# Patient Record
Sex: FEMALE | Race: WHITE | Employment: PART TIME
[De-identification: names, ages, dates, MRNs, and addresses within clinical notes are randomized per-mention and may not be internally consistent; named-entity substitution may affect disease eponyms.]

---

## 2017-01-18 ENCOUNTER — OFFICE VISIT (OUTPATIENT)
Dept: OBGYN | Facility: CLINIC | Age: 27
End: 2017-01-18

## 2017-01-18 ENCOUNTER — TELEPHONE (OUTPATIENT)
Dept: OBGYN | Facility: CLINIC | Age: 27
End: 2017-01-18

## 2017-01-18 VITALS
SYSTOLIC BLOOD PRESSURE: 110 MMHG | WEIGHT: 169 LBS | HEIGHT: 70 IN | DIASTOLIC BLOOD PRESSURE: 70 MMHG | BODY MASS INDEX: 24.2 KG/M2

## 2017-01-18 DIAGNOSIS — Z30.9 ENCOUNTER FOR PHYSICAL EXAMINATION, CONTRACEPTION, AND PAPANICOLAOU SMEAR: Primary | ICD-10-CM

## 2017-01-18 DIAGNOSIS — N94.6 DYSMENORRHEA: ICD-10-CM

## 2017-01-18 DIAGNOSIS — Z11.3 ROUTINE SCREENING FOR STI (SEXUALLY TRANSMITTED INFECTION): ICD-10-CM

## 2017-01-18 DIAGNOSIS — N89.8 VAGINAL IRRITATION: ICD-10-CM

## 2017-01-18 DIAGNOSIS — E28.2 PCOS (POLYCYSTIC OVARIAN SYNDROME): ICD-10-CM

## 2017-01-18 DIAGNOSIS — Z01.419 ENCOUNTER FOR PHYSICAL EXAMINATION, CONTRACEPTION, AND PAPANICOLAOU SMEAR: Primary | ICD-10-CM

## 2017-01-18 PROCEDURE — 99213 OFFICE O/P EST LOW 20 MIN: CPT | Performed by: OBSTETRICS & GYNECOLOGY

## 2017-01-18 RX ORDER — NORGESTIMATE AND ETHINYL ESTRADIOL 7DAYSX3 LO
1 KIT ORAL DAILY
Qty: 28 TABLET | Refills: 13 | Status: SHIPPED | OUTPATIENT
Start: 2017-01-18 | End: 2017-05-09

## 2017-01-18 ASSESSMENT — PATIENT HEALTH QUESTIONNAIRE - PHQ9
SUM OF ALL RESPONSES TO PHQ9 QUESTIONS 1 & 2: 2
2. FEELING DOWN, DEPRESSED OR HOPELESS: 1
SUM OF ALL RESPONSES TO PHQ QUESTIONS 1-9: 2
1. LITTLE INTEREST OR PLEASURE IN DOING THINGS: 1

## 2017-01-20 RX ORDER — METRONIDAZOLE 500 MG/1
500 TABLET ORAL 2 TIMES DAILY
Qty: 14 TABLET | Refills: 0 | Status: SHIPPED | OUTPATIENT
Start: 2017-01-20 | End: 2017-01-27

## 2017-01-23 RX ORDER — NORGESTIMATE AND ETHINYL ESTRADIOL 7DAYSX3 28
1 KIT ORAL DAILY
Qty: 28 TABLET | Refills: 12 | Status: SHIPPED | OUTPATIENT
Start: 2017-01-23 | End: 2018-03-14 | Stop reason: SDUPTHER

## 2017-01-24 ENCOUNTER — TELEPHONE (OUTPATIENT)
Dept: OBGYN | Facility: CLINIC | Age: 27
End: 2017-01-24

## 2017-05-09 ENCOUNTER — OFFICE VISIT (OUTPATIENT)
Dept: OBGYN CLINIC | Age: 27
End: 2017-05-09
Payer: MEDICARE

## 2017-05-09 VITALS
BODY MASS INDEX: 23.34 KG/M2 | WEIGHT: 163 LBS | SYSTOLIC BLOOD PRESSURE: 112 MMHG | HEIGHT: 70 IN | DIASTOLIC BLOOD PRESSURE: 62 MMHG

## 2017-05-09 DIAGNOSIS — N89.8 VAGINAL IRRITATION: Primary | ICD-10-CM

## 2017-05-09 PROCEDURE — 99213 OFFICE O/P EST LOW 20 MIN: CPT | Performed by: NURSE PRACTITIONER

## 2017-05-11 DIAGNOSIS — N89.8 VAGINAL IRRITATION: ICD-10-CM

## 2017-05-11 RX ORDER — NITROFURANTOIN 25; 75 MG/1; MG/1
100 CAPSULE ORAL 2 TIMES DAILY
Qty: 20 CAPSULE | Refills: 0 | Status: SHIPPED | OUTPATIENT
Start: 2017-05-11 | End: 2017-05-21

## 2018-02-21 PROBLEM — R41.840 LACK OF CONCENTRATION: Status: ACTIVE | Noted: 2018-02-21

## 2018-02-21 PROBLEM — F41.8 DEPRESSION WITH ANXIETY: Status: ACTIVE | Noted: 2018-02-21

## 2018-02-21 PROBLEM — N94.3 PMS (PREMENSTRUAL SYNDROME): Status: ACTIVE | Noted: 2018-02-21

## 2018-03-15 ENCOUNTER — HOSPITAL ENCOUNTER (OUTPATIENT)
Age: 28
Discharge: HOME | End: 2018-03-15
Payer: MEDICARE

## 2018-03-15 DIAGNOSIS — Z02.0 ENCOUNTER FOR SCHOOL HISTORY AND PHYSICAL EXAMINATION: ICD-10-CM

## 2018-03-15 DIAGNOSIS — F41.8 DEPRESSION WITH ANXIETY: ICD-10-CM

## 2018-03-15 LAB
HBV SURFACE AB TITR SER: <3.5 MIU/ML
HEPATITIS B SURFACE ANTIGEN: NONREACTIVE
RUBV IGG SER QL: 288.3 IU/ML
THYROXINE, FREE: 1.07 NG/DL (ref 0.93–1.7)
TSH SERPL DL<=0.05 MIU/L-ACNC: 2.91 MIU/L (ref 0.3–5)

## 2018-03-15 PROCEDURE — 36415 COLL VENOUS BLD VENIPUNCTURE: CPT

## 2018-03-15 PROCEDURE — 84443 ASSAY THYROID STIM HORMONE: CPT

## 2018-03-15 PROCEDURE — 86762 RUBELLA ANTIBODY: CPT

## 2018-03-15 PROCEDURE — 86787 VARICELLA-ZOSTER ANTIBODY: CPT

## 2018-03-15 PROCEDURE — 86765 RUBEOLA ANTIBODY: CPT

## 2018-03-15 PROCEDURE — 84439 ASSAY OF FREE THYROXINE: CPT

## 2018-03-15 PROCEDURE — 87340 HEPATITIS B SURFACE AG IA: CPT

## 2018-03-15 PROCEDURE — 86735 MUMPS ANTIBODY: CPT

## 2018-03-15 PROCEDURE — 86317 IMMUNOASSAY INFECTIOUS AGENT: CPT

## 2018-03-16 LAB
MEASLES IMMUNE (IGG): 4
MUV IGG SER QL: 2.38
VZV IGG SER QL IA: 1.91

## 2018-04-03 PROBLEM — Z79.899 MEDICATION MANAGEMENT: Status: ACTIVE | Noted: 2018-04-03

## 2018-04-03 PROBLEM — F98.8 ATTENTION DEFICIT DISORDER (ADD) WITHOUT HYPERACTIVITY: Status: ACTIVE | Noted: 2018-04-03

## 2018-06-26 RX ORDER — NORGESTIMATE AND ETHINYL ESTRADIOL 7DAYSX3 28
KIT ORAL
Qty: 28 TABLET | Refills: 1 | Status: SHIPPED | OUTPATIENT
Start: 2018-06-26 | End: 2018-10-15 | Stop reason: SDUPTHER

## 2018-10-15 PROBLEM — R41.840 LACK OF CONCENTRATION: Status: RESOLVED | Noted: 2018-02-21 | Resolved: 2018-10-15

## 2018-12-21 RX ORDER — NORGESTIMATE AND ETHINYL ESTRADIOL 7DAYSX3 28
KIT ORAL
Qty: 28 TABLET | Refills: 1 | OUTPATIENT
Start: 2018-12-21

## 2018-12-26 DIAGNOSIS — Z79.899 MEDICATION MANAGEMENT: ICD-10-CM

## 2018-12-26 DIAGNOSIS — F98.8 ATTENTION DEFICIT DISORDER (ADD) WITHOUT HYPERACTIVITY: ICD-10-CM

## 2018-12-26 RX ORDER — DEXTROAMPHETAMINE SACCHARATE, AMPHETAMINE ASPARTATE, DEXTROAMPHETAMINE SULFATE AND AMPHETAMINE SULFATE 2.5; 2.5; 2.5; 2.5 MG/1; MG/1; MG/1; MG/1
10 TABLET ORAL DAILY
Qty: 30 TABLET | Refills: 0 | Status: SHIPPED | OUTPATIENT
Start: 2018-12-26 | End: 2019-01-21 | Stop reason: SDUPTHER

## 2018-12-26 RX ORDER — DEXTROAMPHETAMINE SACCHARATE, AMPHETAMINE ASPARTATE MONOHYDRATE, DEXTROAMPHETAMINE SULFATE AND AMPHETAMINE SULFATE 7.5; 7.5; 7.5; 7.5 MG/1; MG/1; MG/1; MG/1
30 CAPSULE, EXTENDED RELEASE ORAL DAILY
Qty: 30 CAPSULE | Refills: 0 | Status: SHIPPED | OUTPATIENT
Start: 2018-12-26 | End: 2019-01-21 | Stop reason: SDUPTHER

## 2019-01-10 ENCOUNTER — TELEPHONE (OUTPATIENT)
Dept: PRIMARY CARE CLINIC | Age: 29
End: 2019-01-10

## 2019-01-10 RX ORDER — NORGESTIMATE AND ETHINYL ESTRADIOL 7DAYSX3 28
KIT ORAL
Qty: 28 TABLET | Refills: 1 | Status: SHIPPED | OUTPATIENT
Start: 2019-01-10 | End: 2019-03-11 | Stop reason: SDUPTHER

## 2019-01-21 ENCOUNTER — OFFICE VISIT (OUTPATIENT)
Dept: PRIMARY CARE CLINIC | Age: 29
End: 2019-01-21
Payer: MEDICARE

## 2019-01-21 VITALS
DIASTOLIC BLOOD PRESSURE: 70 MMHG | HEART RATE: 84 BPM | WEIGHT: 179.2 LBS | BODY MASS INDEX: 26.27 KG/M2 | SYSTOLIC BLOOD PRESSURE: 116 MMHG | OXYGEN SATURATION: 98 %

## 2019-01-21 DIAGNOSIS — F98.8 ATTENTION DEFICIT DISORDER (ADD) WITHOUT HYPERACTIVITY: ICD-10-CM

## 2019-01-21 DIAGNOSIS — N93.9 VAGINAL SPOTTING: ICD-10-CM

## 2019-01-21 DIAGNOSIS — Z79.899 MEDICATION MANAGEMENT: Primary | ICD-10-CM

## 2019-01-21 LAB
CONTROL: PRESENT
PREGNANCY TEST URINE, POC: NORMAL

## 2019-01-21 PROCEDURE — G8484 FLU IMMUNIZE NO ADMIN: HCPCS | Performed by: PHYSICIAN ASSISTANT

## 2019-01-21 PROCEDURE — 1036F TOBACCO NON-USER: CPT | Performed by: PHYSICIAN ASSISTANT

## 2019-01-21 PROCEDURE — G8419 CALC BMI OUT NRM PARAM NOF/U: HCPCS | Performed by: PHYSICIAN ASSISTANT

## 2019-01-21 PROCEDURE — 81025 URINE PREGNANCY TEST: CPT | Performed by: PHYSICIAN ASSISTANT

## 2019-01-21 PROCEDURE — 99213 OFFICE O/P EST LOW 20 MIN: CPT | Performed by: PHYSICIAN ASSISTANT

## 2019-01-21 PROCEDURE — G8427 DOCREV CUR MEDS BY ELIG CLIN: HCPCS | Performed by: PHYSICIAN ASSISTANT

## 2019-01-21 RX ORDER — DEXTROAMPHETAMINE SACCHARATE, AMPHETAMINE ASPARTATE, DEXTROAMPHETAMINE SULFATE AND AMPHETAMINE SULFATE 2.5; 2.5; 2.5; 2.5 MG/1; MG/1; MG/1; MG/1
10 TABLET ORAL DAILY
Qty: 30 TABLET | Refills: 0 | Status: SHIPPED | OUTPATIENT
Start: 2019-01-21 | End: 2019-03-11 | Stop reason: SDUPTHER

## 2019-01-21 RX ORDER — DEXTROAMPHETAMINE SACCHARATE, AMPHETAMINE ASPARTATE MONOHYDRATE, DEXTROAMPHETAMINE SULFATE AND AMPHETAMINE SULFATE 7.5; 7.5; 7.5; 7.5 MG/1; MG/1; MG/1; MG/1
30 CAPSULE, EXTENDED RELEASE ORAL DAILY
Qty: 30 CAPSULE | Refills: 0 | Status: SHIPPED | OUTPATIENT
Start: 2019-01-21 | End: 2019-03-11 | Stop reason: SDUPTHER

## 2019-01-21 ASSESSMENT — ENCOUNTER SYMPTOMS
SHORTNESS OF BREATH: 0
CHEST TIGHTNESS: 0

## 2019-01-25 DIAGNOSIS — Z79.899 MEDICATION MANAGEMENT: ICD-10-CM

## 2019-01-25 DIAGNOSIS — F98.8 ATTENTION DEFICIT DISORDER (ADD) WITHOUT HYPERACTIVITY: ICD-10-CM

## 2019-03-11 ENCOUNTER — HOSPITAL ENCOUNTER (OUTPATIENT)
Age: 29
Setting detail: SPECIMEN
Discharge: HOME | End: 2019-03-11
Payer: MEDICARE

## 2019-03-11 ENCOUNTER — OFFICE VISIT (OUTPATIENT)
Dept: OBGYN CLINIC | Age: 29
End: 2019-03-11
Payer: MEDICARE

## 2019-03-11 VITALS
SYSTOLIC BLOOD PRESSURE: 122 MMHG | BODY MASS INDEX: 26.4 KG/M2 | OXYGEN SATURATION: 98 % | DIASTOLIC BLOOD PRESSURE: 78 MMHG | HEART RATE: 83 BPM | HEIGHT: 70 IN | WEIGHT: 184.4 LBS

## 2019-03-11 DIAGNOSIS — F98.8 ATTENTION DEFICIT DISORDER (ADD) WITHOUT HYPERACTIVITY: ICD-10-CM

## 2019-03-11 DIAGNOSIS — Z79.899 MEDICATION MANAGEMENT: ICD-10-CM

## 2019-03-11 DIAGNOSIS — Z01.419 ENCOUNTER FOR WELL WOMAN EXAM WITH ROUTINE GYNECOLOGICAL EXAM: Primary | ICD-10-CM

## 2019-03-11 PROCEDURE — G8484 FLU IMMUNIZE NO ADMIN: HCPCS | Performed by: NURSE PRACTITIONER

## 2019-03-11 PROCEDURE — 99395 PREV VISIT EST AGE 18-39: CPT | Performed by: NURSE PRACTITIONER

## 2019-03-11 RX ORDER — DEXTROAMPHETAMINE SACCHARATE, AMPHETAMINE ASPARTATE, DEXTROAMPHETAMINE SULFATE AND AMPHETAMINE SULFATE 2.5; 2.5; 2.5; 2.5 MG/1; MG/1; MG/1; MG/1
10 TABLET ORAL DAILY
Qty: 30 TABLET | Refills: 0 | Status: SHIPPED | OUTPATIENT
Start: 2019-03-11 | End: 2019-04-29 | Stop reason: SDUPTHER

## 2019-03-11 RX ORDER — DEXTROAMPHETAMINE SACCHARATE, AMPHETAMINE ASPARTATE MONOHYDRATE, DEXTROAMPHETAMINE SULFATE AND AMPHETAMINE SULFATE 7.5; 7.5; 7.5; 7.5 MG/1; MG/1; MG/1; MG/1
30 CAPSULE, EXTENDED RELEASE ORAL DAILY
Qty: 30 CAPSULE | Refills: 0 | Status: SHIPPED | OUTPATIENT
Start: 2019-03-11 | End: 2019-04-29 | Stop reason: SDUPTHER

## 2019-03-11 RX ORDER — NORGESTIMATE AND ETHINYL ESTRADIOL 7DAYSX3 28
KIT ORAL
Qty: 28 TABLET | Refills: 12 | Status: SHIPPED | OUTPATIENT
Start: 2019-03-11 | End: 2019-03-11 | Stop reason: CLARIF

## 2019-03-11 RX ORDER — NORETHINDRONE ACETATE AND ETHINYL ESTRADIOL 1MG-20(21)
1 KIT ORAL DAILY
Qty: 1 PACKET | Refills: 3 | Status: SHIPPED | OUTPATIENT
Start: 2019-03-11 | End: 2019-10-21 | Stop reason: SDUPTHER

## 2019-03-11 ASSESSMENT — ENCOUNTER SYMPTOMS
CONSTIPATION: 0
BACK PAIN: 0
ABDOMINAL DISTENTION: 0
SHORTNESS OF BREATH: 0
DIARRHEA: 0
ABDOMINAL PAIN: 0
COUGH: 0

## 2019-03-21 LAB — CYTOLOGY REPORT: NORMAL

## 2019-03-27 ENCOUNTER — OFFICE VISIT (OUTPATIENT)
Dept: PRIMARY CARE CLINIC | Age: 29
End: 2019-03-27
Payer: MEDICARE

## 2019-03-27 VITALS
DIASTOLIC BLOOD PRESSURE: 84 MMHG | BODY MASS INDEX: 26.95 KG/M2 | OXYGEN SATURATION: 98 % | WEIGHT: 187.8 LBS | SYSTOLIC BLOOD PRESSURE: 128 MMHG | HEART RATE: 99 BPM

## 2019-03-27 DIAGNOSIS — F98.8 ATTENTION DEFICIT DISORDER (ADD) WITHOUT HYPERACTIVITY: ICD-10-CM

## 2019-03-27 DIAGNOSIS — R53.83 FATIGUE, UNSPECIFIED TYPE: ICD-10-CM

## 2019-03-27 DIAGNOSIS — Z11.1 PPD SCREENING TEST: Primary | ICD-10-CM

## 2019-03-27 DIAGNOSIS — R63.5 WEIGHT GAIN: ICD-10-CM

## 2019-03-27 PROCEDURE — 99214 OFFICE O/P EST MOD 30 MIN: CPT | Performed by: PHYSICIAN ASSISTANT

## 2019-03-27 PROCEDURE — 86580 TB INTRADERMAL TEST: CPT | Performed by: PHYSICIAN ASSISTANT

## 2019-03-27 PROCEDURE — 1036F TOBACCO NON-USER: CPT | Performed by: PHYSICIAN ASSISTANT

## 2019-03-27 PROCEDURE — G8427 DOCREV CUR MEDS BY ELIG CLIN: HCPCS | Performed by: PHYSICIAN ASSISTANT

## 2019-03-27 PROCEDURE — G8419 CALC BMI OUT NRM PARAM NOF/U: HCPCS | Performed by: PHYSICIAN ASSISTANT

## 2019-03-27 PROCEDURE — G8484 FLU IMMUNIZE NO ADMIN: HCPCS | Performed by: PHYSICIAN ASSISTANT

## 2019-03-27 ASSESSMENT — ENCOUNTER SYMPTOMS
TROUBLE SWALLOWING: 0
COUGH: 0
CONSTIPATION: 0
BACK PAIN: 0
NAUSEA: 0
BLOOD IN STOOL: 0
CHEST TIGHTNESS: 0
EYE PAIN: 0
DIARRHEA: 0
SHORTNESS OF BREATH: 0
EYE ITCHING: 0
VOICE CHANGE: 0
VOMITING: 0
ABDOMINAL PAIN: 0

## 2019-03-27 ASSESSMENT — PATIENT HEALTH QUESTIONNAIRE - PHQ9
2. FEELING DOWN, DEPRESSED OR HOPELESS: 0
SUM OF ALL RESPONSES TO PHQ9 QUESTIONS 1 & 2: 0
SUM OF ALL RESPONSES TO PHQ QUESTIONS 1-9: 0
1. LITTLE INTEREST OR PLEASURE IN DOING THINGS: 0
SUM OF ALL RESPONSES TO PHQ QUESTIONS 1-9: 0

## 2019-04-29 DIAGNOSIS — Z79.899 MEDICATION MANAGEMENT: ICD-10-CM

## 2019-04-29 DIAGNOSIS — F98.8 ATTENTION DEFICIT DISORDER (ADD) WITHOUT HYPERACTIVITY: ICD-10-CM

## 2019-04-29 RX ORDER — DEXTROAMPHETAMINE SACCHARATE, AMPHETAMINE ASPARTATE MONOHYDRATE, DEXTROAMPHETAMINE SULFATE AND AMPHETAMINE SULFATE 7.5; 7.5; 7.5; 7.5 MG/1; MG/1; MG/1; MG/1
30 CAPSULE, EXTENDED RELEASE ORAL DAILY
Qty: 30 CAPSULE | Refills: 0 | Status: SHIPPED | OUTPATIENT
Start: 2019-04-29 | End: 2019-05-28 | Stop reason: SDUPTHER

## 2019-04-29 RX ORDER — DEXTROAMPHETAMINE SACCHARATE, AMPHETAMINE ASPARTATE, DEXTROAMPHETAMINE SULFATE AND AMPHETAMINE SULFATE 2.5; 2.5; 2.5; 2.5 MG/1; MG/1; MG/1; MG/1
10 TABLET ORAL DAILY
Qty: 30 TABLET | Refills: 0 | Status: SHIPPED | OUTPATIENT
Start: 2019-04-29 | End: 2019-05-28 | Stop reason: SDUPTHER

## 2019-05-28 DIAGNOSIS — F98.8 ATTENTION DEFICIT DISORDER (ADD) WITHOUT HYPERACTIVITY: ICD-10-CM

## 2019-05-28 DIAGNOSIS — Z79.899 MEDICATION MANAGEMENT: ICD-10-CM

## 2019-05-28 RX ORDER — DEXTROAMPHETAMINE SACCHARATE, AMPHETAMINE ASPARTATE, DEXTROAMPHETAMINE SULFATE AND AMPHETAMINE SULFATE 2.5; 2.5; 2.5; 2.5 MG/1; MG/1; MG/1; MG/1
TABLET ORAL
Qty: 30 TABLET | Refills: 0 | Status: SHIPPED | OUTPATIENT
Start: 2019-05-28 | End: 2019-07-09 | Stop reason: SDUPTHER

## 2019-05-28 RX ORDER — DEXTROAMPHETAMINE SACCHARATE, AMPHETAMINE ASPARTATE MONOHYDRATE, DEXTROAMPHETAMINE SULFATE AND AMPHETAMINE SULFATE 7.5; 7.5; 7.5; 7.5 MG/1; MG/1; MG/1; MG/1
CAPSULE, EXTENDED RELEASE ORAL
Qty: 30 CAPSULE | Refills: 0 | Status: SHIPPED | OUTPATIENT
Start: 2019-05-28 | End: 2019-07-09 | Stop reason: SDUPTHER

## 2019-07-01 DIAGNOSIS — F98.8 ATTENTION DEFICIT DISORDER (ADD) WITHOUT HYPERACTIVITY: ICD-10-CM

## 2019-07-01 DIAGNOSIS — Z79.899 MEDICATION MANAGEMENT: ICD-10-CM

## 2019-07-01 RX ORDER — DEXTROAMPHETAMINE SACCHARATE, AMPHETAMINE ASPARTATE MONOHYDRATE, DEXTROAMPHETAMINE SULFATE AND AMPHETAMINE SULFATE 7.5; 7.5; 7.5; 7.5 MG/1; MG/1; MG/1; MG/1
CAPSULE, EXTENDED RELEASE ORAL
Qty: 30 CAPSULE | Refills: 0 | OUTPATIENT
Start: 2019-07-01 | End: 2019-07-31

## 2019-07-01 RX ORDER — DEXTROAMPHETAMINE SACCHARATE, AMPHETAMINE ASPARTATE, DEXTROAMPHETAMINE SULFATE AND AMPHETAMINE SULFATE 2.5; 2.5; 2.5; 2.5 MG/1; MG/1; MG/1; MG/1
TABLET ORAL
Qty: 30 TABLET | Refills: 0 | OUTPATIENT
Start: 2019-07-01 | End: 2019-07-31

## 2019-07-09 ENCOUNTER — OFFICE VISIT (OUTPATIENT)
Dept: PRIMARY CARE CLINIC | Age: 29
End: 2019-07-09
Payer: MEDICARE

## 2019-07-09 VITALS
OXYGEN SATURATION: 98 % | HEIGHT: 69 IN | BODY MASS INDEX: 28.97 KG/M2 | DIASTOLIC BLOOD PRESSURE: 68 MMHG | SYSTOLIC BLOOD PRESSURE: 110 MMHG | HEART RATE: 93 BPM | WEIGHT: 195.6 LBS

## 2019-07-09 DIAGNOSIS — Z02.0 SCHOOL PHYSICAL EXAM: Primary | ICD-10-CM

## 2019-07-09 DIAGNOSIS — N91.2 AMENORRHEA: ICD-10-CM

## 2019-07-09 DIAGNOSIS — F98.8 ATTENTION DEFICIT DISORDER (ADD) WITHOUT HYPERACTIVITY: ICD-10-CM

## 2019-07-09 DIAGNOSIS — Z79.899 MEDICATION MANAGEMENT: ICD-10-CM

## 2019-07-09 LAB
CONTROL: PRESENT
PREGNANCY TEST URINE, POC: NEGATIVE

## 2019-07-09 PROCEDURE — 86580 TB INTRADERMAL TEST: CPT | Performed by: PHYSICIAN ASSISTANT

## 2019-07-09 PROCEDURE — 81025 URINE PREGNANCY TEST: CPT | Performed by: PHYSICIAN ASSISTANT

## 2019-07-09 PROCEDURE — 99395 PREV VISIT EST AGE 18-39: CPT | Performed by: PHYSICIAN ASSISTANT

## 2019-07-09 PROCEDURE — 90746 HEPB VACCINE 3 DOSE ADULT IM: CPT | Performed by: PHYSICIAN ASSISTANT

## 2019-07-09 PROCEDURE — 90471 IMMUNIZATION ADMIN: CPT | Performed by: PHYSICIAN ASSISTANT

## 2019-07-09 RX ORDER — DEXTROAMPHETAMINE SACCHARATE, AMPHETAMINE ASPARTATE, DEXTROAMPHETAMINE SULFATE AND AMPHETAMINE SULFATE 2.5; 2.5; 2.5; 2.5 MG/1; MG/1; MG/1; MG/1
TABLET ORAL
Qty: 30 TABLET | Refills: 0 | Status: SHIPPED | OUTPATIENT
Start: 2019-07-09 | End: 2019-08-13 | Stop reason: SDUPTHER

## 2019-07-09 RX ORDER — DEXTROAMPHETAMINE SACCHARATE, AMPHETAMINE ASPARTATE MONOHYDRATE, DEXTROAMPHETAMINE SULFATE AND AMPHETAMINE SULFATE 7.5; 7.5; 7.5; 7.5 MG/1; MG/1; MG/1; MG/1
CAPSULE, EXTENDED RELEASE ORAL
Qty: 30 CAPSULE | Refills: 0 | Status: SHIPPED | OUTPATIENT
Start: 2019-07-09 | End: 2019-08-13 | Stop reason: SDUPTHER

## 2019-07-09 ASSESSMENT — ENCOUNTER SYMPTOMS
ABDOMINAL PAIN: 0
DIARRHEA: 0
EYE ITCHING: 0
VOMITING: 0
BACK PAIN: 0
TROUBLE SWALLOWING: 0
COUGH: 0
VOICE CHANGE: 0
CHEST TIGHTNESS: 0
CONSTIPATION: 0
SHORTNESS OF BREATH: 0
NAUSEA: 0
EYE PAIN: 0
BLOOD IN STOOL: 0

## 2019-07-09 NOTE — PROGRESS NOTES
3     No current facility-administered medications for this visit. No Known Allergies    Health Maintenance   Topic Date Due    Varicella Vaccine (1 of 2 - 13+ 2-dose series) 06/04/2004    Flu vaccine (1) 09/01/2019    Cervical cancer screen  03/11/2020    DTaP/Tdap/Td vaccine (2 - Td) 01/28/2025    HIV screen  Completed    Pneumococcal 0-64 years Vaccine  Aged Out       Subjective:      Review of Systems   Constitutional: Negative for activity change, appetite change, chills, fatigue, fever and unexpected weight change. HENT: Negative for dental problem, hearing loss, trouble swallowing and voice change. Some dry mouth   Eyes: Negative for pain, itching and visual disturbance. Respiratory: Negative for cough, chest tightness and shortness of breath. Cardiovascular: Negative for chest pain, palpitations and leg swelling. Gastrointestinal: Negative for abdominal pain, blood in stool, constipation, diarrhea, nausea and vomiting. Endocrine: Negative for cold intolerance, heat intolerance, polydipsia, polyphagia and polyuria. Genitourinary: Negative for difficulty urinating, dysuria, flank pain, frequency, hematuria, menstrual problem, pelvic pain, urgency, vaginal bleeding, vaginal discharge and vaginal pain. Musculoskeletal: Negative for arthralgias, back pain and myalgias. Skin: Negative for rash. Neurological: Negative for dizziness, syncope, speech difficulty, light-headedness and headaches. Hematological: Does not bruise/bleed easily. Psychiatric/Behavioral: Negative for decreased concentration, dysphoric mood, sleep disturbance and suicidal ideas. The patient is not nervous/anxious and is not hyperactive. Objective:     Vitals:    07/09/19 1511   BP: 110/68   Pulse: 93   SpO2: 98%   Weight: 195 lb 9.6 oz (88.7 kg)   Height: 5' 9\" (1.753 m)     Physical Exam   Constitutional: She is oriented to person, place, and time. She appears well-developed and well-nourished. Monitoring:    Acute and Chronic Pain Monitoring:   RX Monitoring 7/9/2019   Attestation -   Periodic Controlled Substance Monitoring No signs of potential drug abuse or diversion identified. ;Assessed functional status. Return in about 2 days (around 7/11/2019) for or Friday for PPD read. Orders Placed This Encounter   Procedures    Hep B Vaccine Adult (ENGERIX B)    Mantoux testing    POCT urine pregnancy     Orders Placed This Encounter   Medications    amphetamine-dextroamphetamine (ADDERALL XR) 30 MG extended release capsule     Sig: take 1 capsule by mouth once daily     Dispense:  30 capsule     Refill:  0    amphetamine-dextroamphetamine (ADDERALL) 10 MG tablet     Sig: take 1 tablet by mouth daily . TAKE NO LATER THAN 2PM.     Dispense:  30 tablet     Refill:  0       Patient given educational materials - see patient instructions. Discussed use,benefit, and side effects of prescribed medications. All patient questions answered. Pt voiced understanding. Reviewed health maintenance. Instructed to continue currentmedications, healthy diet and regular, aerobic exercise.           Electronically signed by Randall Ansari PA-C on 7/9/2019 at 3:48 PM

## 2019-07-11 ENCOUNTER — NURSE ONLY (OUTPATIENT)
Dept: PRIMARY CARE CLINIC | Age: 29
End: 2019-07-11

## 2019-07-11 DIAGNOSIS — Z11.1 ENCOUNTER FOR PPD SKIN TEST READING: Primary | ICD-10-CM

## 2019-07-11 LAB
INDURATION: NORMAL
TB SKIN TEST: NEGATIVE

## 2019-08-13 DIAGNOSIS — Z79.899 MEDICATION MANAGEMENT: ICD-10-CM

## 2019-08-13 DIAGNOSIS — F98.8 ATTENTION DEFICIT DISORDER (ADD) WITHOUT HYPERACTIVITY: ICD-10-CM

## 2019-08-13 RX ORDER — DEXTROAMPHETAMINE SACCHARATE, AMPHETAMINE ASPARTATE MONOHYDRATE, DEXTROAMPHETAMINE SULFATE AND AMPHETAMINE SULFATE 7.5; 7.5; 7.5; 7.5 MG/1; MG/1; MG/1; MG/1
CAPSULE, EXTENDED RELEASE ORAL
Qty: 30 CAPSULE | Refills: 0 | Status: SHIPPED | OUTPATIENT
Start: 2019-08-13 | End: 2019-09-17 | Stop reason: SDUPTHER

## 2019-08-13 RX ORDER — DEXTROAMPHETAMINE SACCHARATE, AMPHETAMINE ASPARTATE, DEXTROAMPHETAMINE SULFATE AND AMPHETAMINE SULFATE 2.5; 2.5; 2.5; 2.5 MG/1; MG/1; MG/1; MG/1
TABLET ORAL
Qty: 30 TABLET | Refills: 0 | Status: SHIPPED | OUTPATIENT
Start: 2019-08-13 | End: 2019-09-17 | Stop reason: SDUPTHER

## 2019-09-17 DIAGNOSIS — F98.8 ATTENTION DEFICIT DISORDER (ADD) WITHOUT HYPERACTIVITY: ICD-10-CM

## 2019-09-17 DIAGNOSIS — Z79.899 MEDICATION MANAGEMENT: ICD-10-CM

## 2019-09-17 RX ORDER — DEXTROAMPHETAMINE SACCHARATE, AMPHETAMINE ASPARTATE, DEXTROAMPHETAMINE SULFATE AND AMPHETAMINE SULFATE 2.5; 2.5; 2.5; 2.5 MG/1; MG/1; MG/1; MG/1
TABLET ORAL
Qty: 30 TABLET | Refills: 0 | Status: SHIPPED | OUTPATIENT
Start: 2019-09-17 | End: 2019-10-21 | Stop reason: SDUPTHER

## 2019-09-17 RX ORDER — DEXTROAMPHETAMINE SACCHARATE, AMPHETAMINE ASPARTATE MONOHYDRATE, DEXTROAMPHETAMINE SULFATE AND AMPHETAMINE SULFATE 7.5; 7.5; 7.5; 7.5 MG/1; MG/1; MG/1; MG/1
CAPSULE, EXTENDED RELEASE ORAL
Qty: 30 CAPSULE | Refills: 0 | Status: SHIPPED | OUTPATIENT
Start: 2019-09-17 | End: 2019-10-21 | Stop reason: SDUPTHER

## 2019-10-08 DIAGNOSIS — Z79.899 MEDICATION MANAGEMENT: ICD-10-CM

## 2019-10-08 DIAGNOSIS — F41.8 DEPRESSION WITH ANXIETY: ICD-10-CM

## 2019-10-08 RX ORDER — CITALOPRAM 20 MG/1
TABLET ORAL
Qty: 30 TABLET | Refills: 3 | Status: SHIPPED | OUTPATIENT
Start: 2019-10-08 | End: 2019-11-21 | Stop reason: SDUPTHER

## 2019-10-21 ENCOUNTER — TELEPHONE (OUTPATIENT)
Dept: PRIMARY CARE CLINIC | Age: 29
End: 2019-10-21

## 2019-10-21 DIAGNOSIS — Z79.899 MEDICATION MANAGEMENT: ICD-10-CM

## 2019-10-21 DIAGNOSIS — F98.8 ATTENTION DEFICIT DISORDER (ADD) WITHOUT HYPERACTIVITY: ICD-10-CM

## 2019-10-21 RX ORDER — DEXTROAMPHETAMINE SACCHARATE, AMPHETAMINE ASPARTATE MONOHYDRATE, DEXTROAMPHETAMINE SULFATE AND AMPHETAMINE SULFATE 7.5; 7.5; 7.5; 7.5 MG/1; MG/1; MG/1; MG/1
CAPSULE, EXTENDED RELEASE ORAL
Qty: 30 CAPSULE | Refills: 0 | Status: SHIPPED | OUTPATIENT
Start: 2019-10-21 | End: 2019-11-22 | Stop reason: SDUPTHER

## 2019-10-21 RX ORDER — DEXTROAMPHETAMINE SACCHARATE, AMPHETAMINE ASPARTATE, DEXTROAMPHETAMINE SULFATE AND AMPHETAMINE SULFATE 2.5; 2.5; 2.5; 2.5 MG/1; MG/1; MG/1; MG/1
TABLET ORAL
Qty: 30 TABLET | Refills: 0 | Status: SHIPPED | OUTPATIENT
Start: 2019-10-21 | End: 2019-11-22 | Stop reason: SDUPTHER

## 2019-10-21 RX ORDER — NORETHINDRONE ACETATE AND ETHINYL ESTRADIOL 1MG-20(21)
1 KIT ORAL DAILY
Qty: 1 PACKET | Refills: 11 | Status: SHIPPED | OUTPATIENT
Start: 2019-10-21 | End: 2020-07-10 | Stop reason: SDUPTHER

## 2019-10-29 DIAGNOSIS — F98.8 ATTENTION DEFICIT DISORDER (ADD) WITHOUT HYPERACTIVITY: ICD-10-CM

## 2019-10-29 DIAGNOSIS — Z79.899 MEDICATION MANAGEMENT: ICD-10-CM

## 2019-10-29 RX ORDER — DEXTROAMPHETAMINE SACCHARATE, AMPHETAMINE ASPARTATE MONOHYDRATE, DEXTROAMPHETAMINE SULFATE AND AMPHETAMINE SULFATE 7.5; 7.5; 7.5; 7.5 MG/1; MG/1; MG/1; MG/1
CAPSULE, EXTENDED RELEASE ORAL
Qty: 30 CAPSULE | Refills: 0 | OUTPATIENT
Start: 2019-10-29

## 2019-10-29 RX ORDER — DEXTROAMPHETAMINE SACCHARATE, AMPHETAMINE ASPARTATE, DEXTROAMPHETAMINE SULFATE AND AMPHETAMINE SULFATE 2.5; 2.5; 2.5; 2.5 MG/1; MG/1; MG/1; MG/1
TABLET ORAL
Qty: 30 TABLET | Refills: 0 | OUTPATIENT
Start: 2019-10-29

## 2019-11-21 DIAGNOSIS — Z79.899 MEDICATION MANAGEMENT: ICD-10-CM

## 2019-11-21 DIAGNOSIS — F98.8 ATTENTION DEFICIT DISORDER (ADD) WITHOUT HYPERACTIVITY: ICD-10-CM

## 2019-11-21 DIAGNOSIS — F41.8 DEPRESSION WITH ANXIETY: ICD-10-CM

## 2019-11-22 RX ORDER — DEXTROAMPHETAMINE SACCHARATE, AMPHETAMINE ASPARTATE MONOHYDRATE, DEXTROAMPHETAMINE SULFATE AND AMPHETAMINE SULFATE 7.5; 7.5; 7.5; 7.5 MG/1; MG/1; MG/1; MG/1
CAPSULE, EXTENDED RELEASE ORAL
Qty: 30 CAPSULE | Refills: 0 | Status: SHIPPED | OUTPATIENT
Start: 2019-11-22 | End: 2020-01-06 | Stop reason: ALTCHOICE

## 2019-11-22 RX ORDER — DEXTROAMPHETAMINE SACCHARATE, AMPHETAMINE ASPARTATE, DEXTROAMPHETAMINE SULFATE AND AMPHETAMINE SULFATE 2.5; 2.5; 2.5; 2.5 MG/1; MG/1; MG/1; MG/1
TABLET ORAL
Qty: 30 TABLET | Refills: 0 | Status: SHIPPED | OUTPATIENT
Start: 2019-11-22 | End: 2020-01-06 | Stop reason: ALTCHOICE

## 2019-11-22 RX ORDER — CITALOPRAM 20 MG/1
20 TABLET ORAL DAILY
Qty: 30 TABLET | Refills: 5 | Status: SHIPPED | OUTPATIENT
Start: 2019-11-22 | End: 2020-07-10 | Stop reason: SDUPTHER

## 2019-12-24 ENCOUNTER — TELEPHONE (OUTPATIENT)
Dept: PRIMARY CARE CLINIC | Age: 29
End: 2019-12-24

## 2020-01-06 ENCOUNTER — OFFICE VISIT (OUTPATIENT)
Dept: PRIMARY CARE CLINIC | Age: 30
End: 2020-01-06
Payer: MEDICARE

## 2020-01-06 VITALS
WEIGHT: 201 LBS | SYSTOLIC BLOOD PRESSURE: 112 MMHG | OXYGEN SATURATION: 96 % | HEIGHT: 69 IN | DIASTOLIC BLOOD PRESSURE: 78 MMHG | BODY MASS INDEX: 29.77 KG/M2 | HEART RATE: 105 BPM

## 2020-01-06 PROCEDURE — G8419 CALC BMI OUT NRM PARAM NOF/U: HCPCS | Performed by: PHYSICIAN ASSISTANT

## 2020-01-06 PROCEDURE — 99213 OFFICE O/P EST LOW 20 MIN: CPT | Performed by: PHYSICIAN ASSISTANT

## 2020-01-06 PROCEDURE — 1036F TOBACCO NON-USER: CPT | Performed by: PHYSICIAN ASSISTANT

## 2020-01-06 PROCEDURE — G8427 DOCREV CUR MEDS BY ELIG CLIN: HCPCS | Performed by: PHYSICIAN ASSISTANT

## 2020-01-06 PROCEDURE — G8482 FLU IMMUNIZE ORDER/ADMIN: HCPCS | Performed by: PHYSICIAN ASSISTANT

## 2020-01-06 ASSESSMENT — ENCOUNTER SYMPTOMS
VOMITING: 0
NAUSEA: 0
BACK PAIN: 0
CHEST TIGHTNESS: 0
SHORTNESS OF BREATH: 0

## 2020-01-06 NOTE — PROGRESS NOTES
[facility]  [address] [facility] [address]  Dept: [phone]    Rosita Guerrero is a 30 y.o. female who presents today for her medical conditions/complaintsas noted below. Chief Complaint   Patient presents with    3 Month Follow-Up     patient f/u due to medication check. PAtient currently taking Adderall 30XR and Adderall 10. Patient Taking Celexa for anxiety and feels like it is helping her anxiety. Patient has some concerns with the Adderall not working as well as it use to. HPI:     HPI  Noticed last ester with study she started having trouble retaining info. Worsened thru ester. Fiance noticed not focusing too    Takes the XR in am 7-8am and IR at 2-3pm.    Out of Adderall XR since Dec 27     Switched time of day with Celexa, but this made no difference. No results found for: LDLCHOLESTEROL, LDLCALC    (goal LDL is <100)   AST (U/L)   Date Value   2015 16     ALT (U/L)   Date Value   2015 11     BUN (mg/dL)   Date Value   2015 9     BP Readings from Last 3 Encounters:   20 112/78   19 110/68   19 128/84          (goal 120/80)    Past Medical History:   Diagnosis Date    PCOS (polycystic ovarian syndrome)       No past surgical history on file. Family History   Problem Relation Age of Onset    Emphysema Mother     Other Father         pneumonmia    Diabetes Father     Emphysema Maternal Grandmother     Heart Attack Maternal Grandfather         late 45s early 46s  MI    No Known Problems Paternal Grandmother     No Known Problems Paternal Grandfather        Social History     Tobacco Use    Smoking status: Never Smoker    Smokeless tobacco: Never Used   Substance Use Topics    Alcohol use: No      Current Outpatient Medications   Medication Sig Dispense Refill    Amphet-Dextroamphet 3-Bead ER (MYDAYIS) 25 MG CP24 Take 25 mg by mouth daily for 30 days.  30 capsule 0 rhythm. Heart sounds: Normal heart sounds. No murmur. No friction rub. No gallop. Pulmonary:      Effort: Pulmonary effort is normal.      Breath sounds: Normal breath sounds. No wheezing or rales. Abdominal:      General: Bowel sounds are normal. There is no distension. Palpations: Abdomen is soft. Tenderness: There is no tenderness. Skin:     Findings: No rash. Neurological:      Mental Status: She is alert and oriented to person, place, and time. Psychiatric:         Behavior: Behavior normal.         Thought Content: Thought content normal.         Judgment: Judgment normal.         Assessment:       Diagnosis Orders   1. Medication management  Amphet-Dextroamphet 3-Bead ER (MYDAYIS) 25 MG CP24   2. Attention deficit disorder (ADD) without hyperactivity  Amphet-Dextroamphet 3-Bead ER (MYDAYIS) 25 MG CP24   3. Depression with anxiety          Plan: Will do prior auth for Mydayis   Changed from Adderal XR and IR  Will ibrahima urine drug screen and contract next month  Return in about 1 month (around 2/6/2020) for ADD. No orders of the defined types were placed in this encounter. Orders Placed This Encounter   Medications    Amphet-Dextroamphet 3-Bead ER (MYDAYIS) 25 MG CP24     Sig: Take 25 mg by mouth daily for 30 days. Dispense:  30 capsule     Refill:  0       Patient given educationalmaterials - see patient instructions. Discussed use, benefit, and side effectsof prescribed medications. All patient questions answered. Pt voiced understanding. Reviewed health maintenance. Instructed to continue current medications, diet andexercise. Patient agreed with treatment plan. Follow up as directed.      Electronicallysigned by Gabo Pulido PA-C on 1/6/2020 at 11:38 AM

## 2020-01-09 ENCOUNTER — TELEPHONE (OUTPATIENT)
Dept: PRIMARY CARE CLINIC | Age: 30
End: 2020-01-09

## 2020-01-09 NOTE — TELEPHONE ENCOUNTER
Patient doesn't want to wait for the prior authorization any longer, can you please call in another medication for her addhd. Can you just call in the generic version of adderall 30mg and extended release.

## 2020-01-13 RX ORDER — DEXTROAMPHETAMINE SACCHARATE, AMPHETAMINE ASPARTATE, DEXTROAMPHETAMINE SULFATE AND AMPHETAMINE SULFATE 2.5; 2.5; 2.5; 2.5 MG/1; MG/1; MG/1; MG/1
TABLET ORAL
Qty: 30 TABLET | Refills: 0 | Status: SHIPPED | OUTPATIENT
Start: 2020-01-13 | End: 2020-02-21 | Stop reason: SDUPTHER

## 2020-01-13 RX ORDER — DEXTROAMPHETAMINE SACCHARATE, AMPHETAMINE ASPARTATE MONOHYDRATE, DEXTROAMPHETAMINE SULFATE AND AMPHETAMINE SULFATE 7.5; 7.5; 7.5; 7.5 MG/1; MG/1; MG/1; MG/1
CAPSULE, EXTENDED RELEASE ORAL
Qty: 30 CAPSULE | Refills: 0 | Status: SHIPPED | OUTPATIENT
Start: 2020-01-13 | End: 2020-02-21 | Stop reason: SDUPTHER

## 2020-01-13 NOTE — TELEPHONE ENCOUNTER
Please let her know that Raman Abel was denied and I sent in the refills as she was taking them. F/U in one month if needs a change. These pended meds still won't escribe---do you know how to fix the demographic info so I can send them?

## 2020-01-13 NOTE — TELEPHONE ENCOUNTER
Pt calling again. Her demographics is correct, if that is what was needed. Asking to get a call re the P.A. today please.

## 2020-02-20 NOTE — TELEPHONE ENCOUNTER
Patient is requesting a medication. However, patient was unsure of dosage for prescription and refused to elaborate on what the medication is for. Patient can be reached at [phone].

## 2020-02-20 NOTE — TELEPHONE ENCOUNTER
Left patient a VM to call office back to try to get a little bit more information on what medication is being requested

## 2020-02-21 RX ORDER — DEXTROAMPHETAMINE SACCHARATE, AMPHETAMINE ASPARTATE, DEXTROAMPHETAMINE SULFATE AND AMPHETAMINE SULFATE 2.5; 2.5; 2.5; 2.5 MG/1; MG/1; MG/1; MG/1
TABLET ORAL
Qty: 30 TABLET | Refills: 0 | Status: SHIPPED | OUTPATIENT
Start: 2020-02-21 | End: 2020-04-22 | Stop reason: DRUGHIGH

## 2020-02-21 RX ORDER — DEXTROAMPHETAMINE SACCHARATE, AMPHETAMINE ASPARTATE MONOHYDRATE, DEXTROAMPHETAMINE SULFATE AND AMPHETAMINE SULFATE 7.5; 7.5; 7.5; 7.5 MG/1; MG/1; MG/1; MG/1
CAPSULE, EXTENDED RELEASE ORAL
Qty: 30 CAPSULE | Refills: 0 | Status: SHIPPED | OUTPATIENT
Start: 2020-02-21 | End: 2020-04-22 | Stop reason: SDUPTHER

## 2020-04-22 ENCOUNTER — TELEMEDICINE (OUTPATIENT)
Dept: PRIMARY CARE CLINIC | Age: 30
End: 2020-04-22
Payer: MEDICARE

## 2020-04-22 VITALS — BODY MASS INDEX: 28.73 KG/M2 | HEART RATE: 63 BPM | HEIGHT: 69 IN | WEIGHT: 194 LBS

## 2020-04-22 PROCEDURE — 99213 OFFICE O/P EST LOW 20 MIN: CPT | Performed by: PHYSICIAN ASSISTANT

## 2020-04-22 PROCEDURE — G8427 DOCREV CUR MEDS BY ELIG CLIN: HCPCS | Performed by: PHYSICIAN ASSISTANT

## 2020-04-22 RX ORDER — DEXTROAMPHETAMINE SACCHARATE, AMPHETAMINE ASPARTATE, DEXTROAMPHETAMINE SULFATE AND AMPHETAMINE SULFATE 5; 5; 5; 5 MG/1; MG/1; MG/1; MG/1
20 TABLET ORAL DAILY
Qty: 30 TABLET | Refills: 0 | Status: SHIPPED | OUTPATIENT
Start: 2020-04-22 | End: 2020-06-16 | Stop reason: SDUPTHER

## 2020-04-22 RX ORDER — DEXTROAMPHETAMINE SACCHARATE, AMPHETAMINE ASPARTATE MONOHYDRATE, DEXTROAMPHETAMINE SULFATE AND AMPHETAMINE SULFATE 7.5; 7.5; 7.5; 7.5 MG/1; MG/1; MG/1; MG/1
CAPSULE, EXTENDED RELEASE ORAL
Qty: 30 CAPSULE | Refills: 0 | Status: SHIPPED | OUTPATIENT
Start: 2020-04-22 | End: 2020-06-16 | Stop reason: SDUPTHER

## 2020-04-22 ASSESSMENT — ENCOUNTER SYMPTOMS
CHEST TIGHTNESS: 0
SHORTNESS OF BREATH: 0

## 2020-04-22 NOTE — PROGRESS NOTES
[facility]  [address] [facility] [address]  Dept: [phone]    Jeimy Leary is a 30 y.o. female who presents today for her medical conditions/complaintsas noted below. Chief Complaint   Patient presents with    Medication Check     patient taking Adderall XR 30mg and 10mg IR. Patient needs [facility]TD drug contract and drug screen. HPI:     HPI   VV takes place with myself and Piero Patton and her daughter from her home    Adderall XR is working well but the Adderall IR is not. She takes the IR at somewhere between noon and 2pm        No results found for: LDLCHOLESTEROL, LDLCALC    (goal LDL is <100)   AST (U/L)   Date Value   2015 16     ALT (U/L)   Date Value   2015 11     BUN (mg/dL)   Date Value   2015 9     BP Readings from Last 3 Encounters:   20 112/78   19 110/68   19 128/84          (goal 120/80)    Past Medical History:   Diagnosis Date    PCOS (polycystic ovarian syndrome)       No past surgical history on file.     Family History   Problem Relation Age of Onset    Emphysema Mother     Other Father         pneumonmia    Diabetes Father     Emphysema Maternal Grandmother     Heart Attack Maternal Grandfather         late 45s early 46s  MI    No Known Problems Paternal Grandmother     No Known Problems Paternal Grandfather        Social History     Tobacco Use    Smoking status: Never Smoker    Smokeless tobacco: Never Used   Substance Use Topics    Alcohol use: No      Current Outpatient Medications   Medication Sig Dispense Refill    amphetamine-dextroamphetamine (ADDERALL XR) 30 MG extended release capsule take 1 capsule by mouth once daily 30 capsule 0    citalopram (CELEXA) 20 MG tablet Take 1 tablet by mouth daily take 1 tablet by mouth once daily 30 tablet 5    norethindrone-ethinyl estradiol (JUNEL ) 1-20 MG-MCG per tablet Take 1 tablet by mouth daily 1 packet 11

## 2020-06-16 RX ORDER — DEXTROAMPHETAMINE SACCHARATE, AMPHETAMINE ASPARTATE MONOHYDRATE, DEXTROAMPHETAMINE SULFATE AND AMPHETAMINE SULFATE 7.5; 7.5; 7.5; 7.5 MG/1; MG/1; MG/1; MG/1
CAPSULE, EXTENDED RELEASE ORAL
Qty: 30 CAPSULE | Refills: 0 | Status: SHIPPED | OUTPATIENT
Start: 2020-06-16 | End: 2020-07-10 | Stop reason: SDUPTHER

## 2020-06-16 RX ORDER — DEXTROAMPHETAMINE SACCHARATE, AMPHETAMINE ASPARTATE, DEXTROAMPHETAMINE SULFATE AND AMPHETAMINE SULFATE 5; 5; 5; 5 MG/1; MG/1; MG/1; MG/1
20 TABLET ORAL DAILY
Qty: 30 TABLET | Refills: 0 | Status: SHIPPED | OUTPATIENT
Start: 2020-06-16 | End: 2020-07-10 | Stop reason: SDUPTHER

## 2020-07-10 RX ORDER — CITALOPRAM 20 MG/1
20 TABLET ORAL DAILY
Qty: 30 TABLET | Refills: 5 | Status: SHIPPED | OUTPATIENT
Start: 2020-07-10 | End: 2021-01-04 | Stop reason: SDUPTHER

## 2020-07-10 RX ORDER — DEXTROAMPHETAMINE SACCHARATE, AMPHETAMINE ASPARTATE, DEXTROAMPHETAMINE SULFATE AND AMPHETAMINE SULFATE 5; 5; 5; 5 MG/1; MG/1; MG/1; MG/1
20 TABLET ORAL DAILY
Qty: 30 TABLET | Refills: 0 | Status: SHIPPED | OUTPATIENT
Start: 2020-07-10 | End: 2020-10-09 | Stop reason: SDUPTHER

## 2020-07-10 RX ORDER — DEXTROAMPHETAMINE SACCHARATE, AMPHETAMINE ASPARTATE MONOHYDRATE, DEXTROAMPHETAMINE SULFATE AND AMPHETAMINE SULFATE 7.5; 7.5; 7.5; 7.5 MG/1; MG/1; MG/1; MG/1
CAPSULE, EXTENDED RELEASE ORAL
Qty: 30 CAPSULE | Refills: 0 | Status: SHIPPED | OUTPATIENT
Start: 2020-07-10 | End: 2020-10-09 | Stop reason: SDUPTHER

## 2020-07-10 RX ORDER — NORETHINDRONE ACETATE AND ETHINYL ESTRADIOL 1MG-20(21)
1 KIT ORAL DAILY
Qty: 1 PACKET | Refills: 11 | Status: SHIPPED | OUTPATIENT
Start: 2020-07-10 | End: 2022-02-15

## 2020-07-29 ENCOUNTER — TELEPHONE (OUTPATIENT)
Dept: PRIMARY CARE CLINIC | Age: 30
End: 2020-07-29

## 2020-07-29 NOTE — TELEPHONE ENCOUNTER
Patient is calling for a return to work clearance      When did your symptoms first begin? No symptoms    When was the last day you had any symptoms including fever? N/A    When was the last date you took medication to treat a fever? N/A    Have you had a positive test result for COVID-19? No; negative test in May after exposure at work. Does your employer require you to be tested for COVID-19 before you return to work? Yes; patient is unsure if she can return to work without being tested first. She has the next couple of days off. She was previously tested through [facility] employee line, but is unable to schedule apt due to the website being down/technical errors. 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 She last had exposure on Friday 7/24/20 for 16 hrs on floor with Covid patients. She states her employer may start having them get tested periodically. Patient requests an order to Covid testing. Do you need a letter to return to work?    No; needs a negative Covid test

## 2020-10-09 ENCOUNTER — TELEMEDICINE (OUTPATIENT)
Dept: PRIMARY CARE CLINIC | Age: 30
End: 2020-10-09
Payer: MEDICARE

## 2020-10-09 PROCEDURE — G8427 DOCREV CUR MEDS BY ELIG CLIN: HCPCS | Performed by: PHYSICIAN ASSISTANT

## 2020-10-09 PROCEDURE — 99214 OFFICE O/P EST MOD 30 MIN: CPT | Performed by: PHYSICIAN ASSISTANT

## 2020-10-09 PROCEDURE — G8484 FLU IMMUNIZE NO ADMIN: HCPCS | Performed by: PHYSICIAN ASSISTANT

## 2020-10-09 PROCEDURE — 1036F TOBACCO NON-USER: CPT | Performed by: PHYSICIAN ASSISTANT

## 2020-10-09 PROCEDURE — G8419 CALC BMI OUT NRM PARAM NOF/U: HCPCS | Performed by: PHYSICIAN ASSISTANT

## 2020-10-09 RX ORDER — DEXTROAMPHETAMINE SACCHARATE, AMPHETAMINE ASPARTATE MONOHYDRATE, DEXTROAMPHETAMINE SULFATE AND AMPHETAMINE SULFATE 7.5; 7.5; 7.5; 7.5 MG/1; MG/1; MG/1; MG/1
CAPSULE, EXTENDED RELEASE ORAL
Qty: 30 CAPSULE | Refills: 0 | Status: SHIPPED | OUTPATIENT
Start: 2020-10-09 | End: 2021-01-04 | Stop reason: SDUPTHER

## 2020-10-09 RX ORDER — DEXTROAMPHETAMINE SACCHARATE, AMPHETAMINE ASPARTATE, DEXTROAMPHETAMINE SULFATE AND AMPHETAMINE SULFATE 5; 5; 5; 5 MG/1; MG/1; MG/1; MG/1
20 TABLET ORAL DAILY
Qty: 30 TABLET | Refills: 0 | Status: SHIPPED | OUTPATIENT
Start: 2020-10-09 | End: 2021-01-04 | Stop reason: SDUPTHER

## 2020-10-09 ASSESSMENT — ENCOUNTER SYMPTOMS
ABDOMINAL DISTENTION: 0
SHORTNESS OF BREATH: 0
RHINORRHEA: 0
CONSTIPATION: 0
EYE DISCHARGE: 0
PHOTOPHOBIA: 0
ABDOMINAL PAIN: 0
CHEST TIGHTNESS: 0
COUGH: 0
VOMITING: 0
SINUS PRESSURE: 0
DIARRHEA: 0
SORE THROAT: 0

## 2020-10-09 NOTE — PROGRESS NOTES
[facility]  [address] [facility] [address]  Dept: [phone]    Sheridan Maldonado is a 35 y.o. female who presents today for her medical conditions/complaintsas noted below. Chief Complaint   Patient presents with    Medication Check       HPI:     HPI: The patient is here for ADHD follow up. She needs a refill of her adderal medication. NO concerns with the medication. She is taking 30mg ER and 20 mg rapid release. She is taking the rapid acting around noon. No chest pain palpitations. NO headaches. Sleeping okay. She is able to eat when on it. Still helping with focus. No results found for: LDLCHOLESTEROL, LDLCALC    (goal LDL is <100)   AST (U/L)   Date Value   2015 16     ALT (U/L)   Date Value   2015 11     BUN (mg/dL)   Date Value   2015 9     BP Readings from Last 3 Encounters:   20 112/78   19 110/68   19 128/84          (goal 120/80)    Past Medical History:   Diagnosis Date    PCOS (polycystic ovarian syndrome)       No past surgical history on file. Family History   Problem Relation Age of Onset    Emphysema Mother     Other Father         pneumonmia    Diabetes Father     Emphysema Maternal Grandmother     Heart Attack Maternal Grandfather         late 45s early 46s  MI    No Known Problems Paternal Grandmother     No Known Problems Paternal Grandfather        Social History     Tobacco Use    Smoking status: Never Smoker    Smokeless tobacco: Never Used   Substance Use Topics    Alcohol use: No      Current Outpatient Medications   Medication Sig Dispense Refill    amphetamine-dextroamphetamine (ADDERALL XR) 30 MG extended release capsule take 1 capsule by mouth once daily 30 capsule 0    amphetamine-dextroamphetamine (ADDERALL, 20MG,) 20 MG tablet Take 1 tablet by mouth daily for 30 days.  30 tablet 0    norethindrone-ethinyl estradiol (JUNEL ) 1-20 MG-MCG per tablet Take 1 tablet by mouth daily 1 packet 11    citalopram (CELEXA) 20 MG tablet Take 1 tablet by mouth daily take 1 tablet by mouth once daily 30 tablet 5     No current facility-administered medications for this visit. No Known Allergies    Health Maintenance   Topic Date Due    Varicella vaccine (1 of 2 - 2-dose childhood series) 06/04/1992    Cervical cancer screen  03/11/2020    Flu vaccine (1) 09/01/2020    DTaP/Tdap/Td vaccine (2 - Td) 01/28/2025    HIV screen  Completed    Hepatitis A vaccine  Aged Out    Hepatitis B vaccine  Aged Out    Hib vaccine  Aged Out    Meningococcal (ACWY) vaccine  Aged Out    Pneumococcal 0-64 years Vaccine  Aged Out       Subjective:      Review of Systems   Constitutional: Negative for chills, fever and unexpected weight change. HENT: Negative for congestion, hearing loss, rhinorrhea, sinus pressure and sore throat. Eyes: Negative for photophobia, discharge and visual disturbance. Respiratory: Negative for cough, chest tightness and shortness of breath. Cardiovascular: Negative for chest pain, palpitations and leg swelling. Gastrointestinal: Negative for abdominal distention, abdominal pain, constipation, diarrhea and vomiting. Endocrine: Negative for polydipsia and polyuria. Genitourinary: Negative for decreased urine volume, difficulty urinating, frequency and urgency. Musculoskeletal: Negative for arthralgias, gait problem and myalgias. Skin: Negative for rash. Allergic/Immunologic: Negative for food allergies. Neurological: Negative for dizziness, weakness, numbness and headaches. Hematological: Negative for adenopathy. Psychiatric/Behavioral: Negative for dysphoric mood and sleep disturbance. The patient is not nervous/anxious. Objective: There were no vitals taken for this visit. Physical Exam  Nursing note reviewed. Constitutional:       Appearance: Normal appearance.    HENT:      Head: Normocephalic and atraumatic. Right Ear: External ear normal.      Left Ear: External ear normal.      Nose: Nose normal.   Eyes:      Extraocular Movements: Extraocular movements intact. Neck:      Musculoskeletal: Normal range of motion. Pulmonary:      Effort: Pulmonary effort is normal. No respiratory distress. Musculoskeletal: Normal range of motion. Neurological:      General: No focal deficit present. Mental Status: She is alert and oriented to person, place, and time. Psychiatric:         Mood and Affect: Mood normal.         Behavior: Behavior normal.         Thought Content: Thought content normal.         Judgment: Judgment normal.         Assessment:       Diagnosis Orders   1. Attention deficit disorder (ADD) without hyperactivity  amphetamine-dextroamphetamine (ADDERALL XR) 30 MG extended release capsule    amphetamine-dextroamphetamine (ADDERALL, 20MG,) 20 MG tablet   2. Medication management  amphetamine-dextroamphetamine (ADDERALL XR) 30 MG extended release capsule    amphetamine-dextroamphetamine (ADDERALL, 20MG,) 20 MG tablet        Plan:       No concerns with medication   Patient was educated that we can do a virtual visit for this medication check however in the future we cannot do virtual visit for controlled substance. Patient educated that her next visit she will need a med contract or drug screen possibly. Medication refilled. Cleo Guerin is a 35 y.o. female being evaluated by a Virtual Visit (video visit) encounter to address concerns as mentioned above. A caregiver was present when appropriate. Due to this being a TeleHealth encounter (During SILDE-51 public health emergency), evaluation of the following organ systems was limited: Vitals/Constitutional/EENT/Resp/CV/GI//MS/Neuro/Skin/Heme-Lymph-Imm.   Pursuant to the emergency declaration under the [address] [place], [address] waiver authority and the [place] [employer] and Response Supplemental Appropriations Act, this Virtual Visit was conducted with patient's (and/or legal guardian's) consent, to reduce the patient's risk of exposure to COVID-19 and provide necessary medical care. The patient (and/or legal guardian) has also been advised to contact this office for worsening conditions or problems, and seek emergency medical treatment and/or call 911 if deemed necessary. Patient identification was verified at the start of the visit: Yes    Total time spent for this encounter: 20 min    Services were provided through a video synchronous discussion virtually to substitute for in-person clinic visit. Patient and provider were located at their individual homes. --DEON Miller on 10/9/2020 at 9:07 AM    An electronic signature was used to authenticate this note. Return in about 3 months (around 1/9/2021) for med check. No orders of the defined types were placed in this encounter. Orders Placed This Encounter   Medications    amphetamine-dextroamphetamine (ADDERALL XR) 30 MG extended release capsule     Sig: take 1 capsule by mouth once daily     Dispense:  30 capsule     Refill:  0    amphetamine-dextroamphetamine (ADDERALL, 20MG,) 20 MG tablet     Sig: Take 1 tablet by mouth daily for 30 days. Dispense:  30 tablet     Refill:  0       Patient given educationalmaterials - see patient instructions. Discussed use, benefit, and side effectsof prescribed medications. All patient questions answered. Pt voiced understanding. Reviewed health maintenance. Instructed to continue current medications, diet andexercise. Patient agreed with treatment plan. Follow up as directed.      Electronicallysigned by DEON Miller on 10/9/2020 at 9:07 AM

## 2020-12-02 ENCOUNTER — TELEPHONE (OUTPATIENT)
Dept: PRIMARY CARE CLINIC | Age: 30
End: 2020-12-02

## 2021-01-04 ENCOUNTER — OFFICE VISIT (OUTPATIENT)
Dept: PRIMARY CARE CLINIC | Age: 31
End: 2021-01-04
Payer: MEDICARE

## 2021-01-04 VITALS
SYSTOLIC BLOOD PRESSURE: 112 MMHG | OXYGEN SATURATION: 99 % | HEART RATE: 100 BPM | BODY MASS INDEX: 31.25 KG/M2 | DIASTOLIC BLOOD PRESSURE: 74 MMHG | WEIGHT: 211.6 LBS

## 2021-01-04 DIAGNOSIS — Z13.6 ENCOUNTER FOR LIPID SCREENING FOR CARDIOVASCULAR DISEASE: ICD-10-CM

## 2021-01-04 DIAGNOSIS — F98.8 ATTENTION DEFICIT DISORDER (ADD) WITHOUT HYPERACTIVITY: ICD-10-CM

## 2021-01-04 DIAGNOSIS — E66.9 OBESITY (BMI 30.0-34.9): ICD-10-CM

## 2021-01-04 DIAGNOSIS — Z13.1 SCREENING FOR DIABETES MELLITUS: ICD-10-CM

## 2021-01-04 DIAGNOSIS — Z13.220 ENCOUNTER FOR LIPID SCREENING FOR CARDIOVASCULAR DISEASE: ICD-10-CM

## 2021-01-04 DIAGNOSIS — F41.9 ANXIETY: ICD-10-CM

## 2021-01-04 DIAGNOSIS — Z79.899 ENCOUNTER FOR LONG-TERM (CURRENT) USE OF HIGH-RISK MEDICATION: Primary | ICD-10-CM

## 2021-01-04 DIAGNOSIS — K21.9 GASTROESOPHAGEAL REFLUX DISEASE, UNSPECIFIED WHETHER ESOPHAGITIS PRESENT: ICD-10-CM

## 2021-01-04 PROBLEM — E66.811 OBESITY (BMI 30.0-34.9): Status: ACTIVE | Noted: 2021-01-04

## 2021-01-04 PROCEDURE — G8419 CALC BMI OUT NRM PARAM NOF/U: HCPCS | Performed by: PHYSICIAN ASSISTANT

## 2021-01-04 PROCEDURE — G8482 FLU IMMUNIZE ORDER/ADMIN: HCPCS | Performed by: PHYSICIAN ASSISTANT

## 2021-01-04 PROCEDURE — 99214 OFFICE O/P EST MOD 30 MIN: CPT | Performed by: PHYSICIAN ASSISTANT

## 2021-01-04 PROCEDURE — 1036F TOBACCO NON-USER: CPT | Performed by: PHYSICIAN ASSISTANT

## 2021-01-04 PROCEDURE — G8427 DOCREV CUR MEDS BY ELIG CLIN: HCPCS | Performed by: PHYSICIAN ASSISTANT

## 2021-01-04 RX ORDER — CITALOPRAM 20 MG/1
20 TABLET ORAL DAILY
Qty: 30 TABLET | Refills: 5 | Status: SHIPPED | OUTPATIENT
Start: 2021-01-04 | End: 2022-02-15

## 2021-01-04 RX ORDER — DEXTROAMPHETAMINE SACCHARATE, AMPHETAMINE ASPARTATE MONOHYDRATE, DEXTROAMPHETAMINE SULFATE AND AMPHETAMINE SULFATE 7.5; 7.5; 7.5; 7.5 MG/1; MG/1; MG/1; MG/1
30 CAPSULE, EXTENDED RELEASE ORAL EVERY MORNING
Qty: 30 CAPSULE | Refills: 0 | Status: SHIPPED | OUTPATIENT
Start: 2021-01-04 | End: 2021-02-18 | Stop reason: SDUPTHER

## 2021-01-04 RX ORDER — DEXTROAMPHETAMINE SACCHARATE, AMPHETAMINE ASPARTATE, DEXTROAMPHETAMINE SULFATE AND AMPHETAMINE SULFATE 5; 5; 5; 5 MG/1; MG/1; MG/1; MG/1
20 TABLET ORAL DAILY
Qty: 30 TABLET | Refills: 0 | Status: SHIPPED | OUTPATIENT
Start: 2021-01-04 | End: 2021-02-18 | Stop reason: SDUPTHER

## 2021-01-04 RX ORDER — ETODOLAC 400 MG/1
TABLET, FILM COATED ORAL
COMMUNITY
Start: 2020-11-09 | End: 2022-02-15

## 2021-01-04 ASSESSMENT — ENCOUNTER SYMPTOMS: ABDOMINAL PAIN: 0

## 2021-01-04 NOTE — PATIENT INSTRUCTIONS

## 2021-01-04 NOTE — PROGRESS NOTES
[address] [place] [address] PRIMARY CARE  [address] [address]  [facility] [address]  Dept: [phone]    Frances Parson is a 35 y.o. female who presents today for her medical conditions/complaintsas noted below. Chief Complaint   Patient presents with    Medication Check     patient has been out of medications since November.  ADHD       HPI:     HPI   ADD:   Pt has not taken her Adderall since November because she got called into work when she was supposed to see DEON Stephenson for her med check. 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 When she was taking them, she felt like both the XR and immediate afternoon dose were wearing off too quickly. Pt says her shift does not start until 6:45, but takes her XR dose early within 30 min of waking up at 4 AM.   Says she does not take the immediate release dose on her days off. Denies any medication side effects. Depression/Anxiety:   Pt ran out of Celexa 1-2 months ago and had withdrawal symptoms (sweats, dizziness, bad dreams), but all of that has subsided now. Depression is not too bad, but she is feeling anxious/overwhelmed without it. More \"on-edge. \" No panic attacks or trouble sleeping. Does not think taking the Adderall increases her anxiety. GERD:   Pt said she thinks she acid reflux has increased at night recently since she has gained weight: Reviewed weight on Vitals flowsheet. Stops eating 1 hour before bed. Drinks 16 oz of coffee in morning, eats a lot of citrus and chocolate. No red sauce lately and no alcohol. Using tums 2x per week. No exercise. Diet- Says she has not made good choices lately and eats a lot of fast food.        No results found for: LDLCHOLESTEROL, LDLCALC    (goal LDL is <100)   AST (U/L)   Date Value   03/26/2015 16     ALT (U/L)   Date Value   03/26/2015 11     BUN (mg/dL)   Date Value   03/26/2015 9 BP Readings from Last 3 Encounters:   21 112/74   20 112/78   19 110/68          (goal 120/80)    Past Medical History:   Diagnosis Date    PCOS (polycystic ovarian syndrome)       No past surgical history on file. Family History   Problem Relation Age of Onset    Emphysema Mother     Other Father         pneumonmia    Diabetes Father     Emphysema Maternal Grandmother     Heart Attack Maternal Grandfather         late 45s early 46s  MI    No Known Problems Paternal Grandmother     No Known Problems Paternal Grandfather        Social History     Tobacco Use    Smoking status: Never Smoker    Smokeless tobacco: Never Used   Substance Use Topics    Alcohol use: No      Current Outpatient Medications   Medication Sig Dispense Refill    Calcium Carbonate Antacid (TUMS PO) Take by mouth      citalopram (CELEXA) 20 MG tablet Take 1 tablet by mouth daily take 1 tablet by mouth once daily 30 tablet 5    amphetamine-dextroamphetamine (ADDERALL XR) 30 MG extended release capsule Take 1 capsule by mouth every morning for 30 days. 30 capsule 0    amphetamine-dextroamphetamine (ADDERALL, 20MG,) 20 MG tablet Take 1 tablet by mouth daily for 30 days. 30 tablet 0    norethindrone-ethinyl estradiol (JUNEL FE ) 1-20 MG-MCG per tablet Take 1 tablet by mouth daily 1 packet 11    etodolac (LODINE) 400 MG tablet take 1 tablet by mouth twice a day with food       No current facility-administered medications for this visit.       No Known Allergies    Health Maintenance   Topic Date Due    Hepatitis C screen  1991    Varicella vaccine (1 of 2 - 2-dose childhood series) 1992    Cervical cancer screen  2020    DTaP/Tdap/Td vaccine (2 - Td) 2025    Flu vaccine  Completed    HIV screen  Completed    Hepatitis A vaccine  Aged Out    Hepatitis B vaccine  Aged Out    Hib vaccine  Aged Out    Meningococcal (ACWY) vaccine  Aged Out -Pt advised to try to take her Adderall XR right before work rather than when she wakes up to see if it lasts long enough for her shift.   -refill current doses of Adderall. 3. Restart Celexa for anxiety. (2 or more stable chronic illnesses)    4. GERD (1 acute uncomplicated illness)  -Try to decrease citrus, coffee, and chocolate in diet.  -Pt declined Rx for pepcid and said she would work on weight loss and continue tums prn.     5-7 Obesity   -Recommended pt get 150 min of exercise per week. -Try to decrease fast food and make better diet choices. Recommended [employer] or [employer] apps to track her calories.   -Check screening labs for DM and lipids. Reviewed family hx: Pt's father had DM and her Maternal grandfather had a MI. Return in about 3 months (around 4/4/2021) for ADD med check with DEON Dowd. Orders Placed This Encounter   Procedures    Urine Drug Screen     Standing Status:   Future     Standing Expiration Date:   1/4/2022    Glucose, Fasting     Standing Status:   Future     Standing Expiration Date:   1/4/2022    Lipid, Fasting     Standing Status:   Future     Standing Expiration Date:   1/4/2022     Orders Placed This Encounter   Medications    citalopram (CELEXA) 20 MG tablet     Sig: Take 1 tablet by mouth daily take 1 tablet by mouth once daily     Dispense:  30 tablet     Refill:  5    amphetamine-dextroamphetamine (ADDERALL XR) 30 MG extended release capsule     Sig: Take 1 capsule by mouth every morning for 30 days. Dispense:  30 capsule     Refill:  0    amphetamine-dextroamphetamine (ADDERALL, 20MG,) 20 MG tablet     Sig: Take 1 tablet by mouth daily for 30 days.      Dispense:  30 tablet     Refill:  0 Patient given educationalmaterials - see patient instructions. Discussed use, benefit, and side effectsof prescribed medications. All patient questions answered. Pt voiced understanding. Reviewed health maintenance. Instructed to continue current medications, diet andexercise. Patient agreed with treatment plan. Follow up as directed.      Electronicallysigned by Kamilla Ayala PA-C on 1/4/2021 at 10:50 AM Spine appears normal, range of motion is not limited, no muscle or joint tenderness

## 2021-01-25 DIAGNOSIS — F98.8 ATTENTION DEFICIT DISORDER (ADD) WITHOUT HYPERACTIVITY: ICD-10-CM

## 2021-01-25 DIAGNOSIS — Z79.899 ENCOUNTER FOR LONG-TERM (CURRENT) USE OF HIGH-RISK MEDICATION: ICD-10-CM

## 2021-02-18 DIAGNOSIS — Z79.899 ENCOUNTER FOR LONG-TERM (CURRENT) USE OF HIGH-RISK MEDICATION: ICD-10-CM

## 2021-02-18 DIAGNOSIS — F98.8 ATTENTION DEFICIT DISORDER (ADD) WITHOUT HYPERACTIVITY: ICD-10-CM

## 2021-02-18 RX ORDER — DEXTROAMPHETAMINE SACCHARATE, AMPHETAMINE ASPARTATE MONOHYDRATE, DEXTROAMPHETAMINE SULFATE AND AMPHETAMINE SULFATE 7.5; 7.5; 7.5; 7.5 MG/1; MG/1; MG/1; MG/1
30 CAPSULE, EXTENDED RELEASE ORAL EVERY MORNING
Qty: 30 CAPSULE | Refills: 0 | Status: SHIPPED | OUTPATIENT
Start: 2021-02-18 | End: 2021-04-27 | Stop reason: SDUPTHER

## 2021-02-18 RX ORDER — DEXTROAMPHETAMINE SACCHARATE, AMPHETAMINE ASPARTATE, DEXTROAMPHETAMINE SULFATE AND AMPHETAMINE SULFATE 5; 5; 5; 5 MG/1; MG/1; MG/1; MG/1
20 TABLET ORAL DAILY
Qty: 30 TABLET | Refills: 0 | Status: SHIPPED | OUTPATIENT
Start: 2021-02-18 | End: 2021-04-27 | Stop reason: SDUPTHER

## 2021-04-27 DIAGNOSIS — F98.8 ATTENTION DEFICIT DISORDER (ADD) WITHOUT HYPERACTIVITY: ICD-10-CM

## 2021-04-27 DIAGNOSIS — Z79.899 ENCOUNTER FOR LONG-TERM (CURRENT) USE OF HIGH-RISK MEDICATION: ICD-10-CM

## 2021-04-27 RX ORDER — DEXTROAMPHETAMINE SACCHARATE, AMPHETAMINE ASPARTATE MONOHYDRATE, DEXTROAMPHETAMINE SULFATE AND AMPHETAMINE SULFATE 7.5; 7.5; 7.5; 7.5 MG/1; MG/1; MG/1; MG/1
30 CAPSULE, EXTENDED RELEASE ORAL EVERY MORNING
Qty: 30 CAPSULE | Refills: 0 | Status: SHIPPED | OUTPATIENT
Start: 2021-04-27 | End: 2022-02-15

## 2021-04-27 RX ORDER — DEXTROAMPHETAMINE SACCHARATE, AMPHETAMINE ASPARTATE, DEXTROAMPHETAMINE SULFATE AND AMPHETAMINE SULFATE 5; 5; 5; 5 MG/1; MG/1; MG/1; MG/1
20 TABLET ORAL DAILY
Qty: 30 TABLET | Refills: 0 | Status: SHIPPED | OUTPATIENT
Start: 2021-04-27 | End: 2022-02-15

## 2021-05-11 ENCOUNTER — TELEPHONE (OUTPATIENT)
Dept: PRIMARY CARE CLINIC | Age: 31
End: 2021-05-11

## 2021-05-11 NOTE — TELEPHONE ENCOUNTER
Pt had an appt scheduled for today @ 1:45 and called concerned that her Ins is not accepted. She has paramount through [employer]. Pt has been a long time Pt with PCP and was asking about self pay amounts. Tried to explain there really was no clear answer especially since Pt's appt pertains to needing a drug screen. Please call to discuss further.

## 2021-12-29 ENCOUNTER — HOSPITAL ENCOUNTER (OUTPATIENT)
Age: 31
Setting detail: SPECIMEN
Discharge: HOME | End: 2021-12-29

## 2021-12-29 ENCOUNTER — OFFICE VISIT (OUTPATIENT)
Dept: PRIMARY CARE CLINIC | Age: 31
End: 2021-12-29
Payer: COMMERCIAL

## 2021-12-29 VITALS
TEMPERATURE: 98.2 F | OXYGEN SATURATION: 98 % | HEART RATE: 116 BPM | WEIGHT: 210.2 LBS | SYSTOLIC BLOOD PRESSURE: 118 MMHG | BODY MASS INDEX: 31.13 KG/M2 | DIASTOLIC BLOOD PRESSURE: 72 MMHG | HEIGHT: 69 IN

## 2021-12-29 DIAGNOSIS — R05.9 COUGH: Primary | ICD-10-CM

## 2021-12-29 DIAGNOSIS — R05.9 COUGH: ICD-10-CM

## 2021-12-29 LAB
INFLUENZA A ANTIBODY: NORMAL
INFLUENZA B ANTIBODY: NORMAL
S PYO AG THROAT QL: NORMAL

## 2021-12-29 PROCEDURE — 87880 STREP A ASSAY W/OPTIC: CPT | Performed by: PHYSICIAN ASSISTANT

## 2021-12-29 PROCEDURE — G8417 CALC BMI ABV UP PARAM F/U: HCPCS | Performed by: PHYSICIAN ASSISTANT

## 2021-12-29 PROCEDURE — 1036F TOBACCO NON-USER: CPT | Performed by: PHYSICIAN ASSISTANT

## 2021-12-29 PROCEDURE — 87804 INFLUENZA ASSAY W/OPTIC: CPT | Performed by: PHYSICIAN ASSISTANT

## 2021-12-29 PROCEDURE — 99214 OFFICE O/P EST MOD 30 MIN: CPT | Performed by: PHYSICIAN ASSISTANT

## 2021-12-29 PROCEDURE — G8484 FLU IMMUNIZE NO ADMIN: HCPCS | Performed by: PHYSICIAN ASSISTANT

## 2021-12-29 PROCEDURE — G8427 DOCREV CUR MEDS BY ELIG CLIN: HCPCS | Performed by: PHYSICIAN ASSISTANT

## 2021-12-29 RX ORDER — AZITHROMYCIN 250 MG/1
TABLET, FILM COATED ORAL
Qty: 1 PACKET | Refills: 0 | Status: SHIPPED | OUTPATIENT
Start: 2021-12-29 | End: 2022-01-08

## 2021-12-29 SDOH — ECONOMIC STABILITY: FOOD INSECURITY: WITHIN THE PAST 12 MONTHS, YOU WORRIED THAT YOUR FOOD WOULD RUN OUT BEFORE YOU GOT MONEY TO BUY MORE.: NEVER TRUE

## 2021-12-29 SDOH — ECONOMIC STABILITY: FOOD INSECURITY: WITHIN THE PAST 12 MONTHS, THE FOOD YOU BOUGHT JUST DIDN'T LAST AND YOU DIDN'T HAVE MONEY TO GET MORE.: NEVER TRUE

## 2021-12-29 ASSESSMENT — ENCOUNTER SYMPTOMS
ABDOMINAL DISTENTION: 0
PHOTOPHOBIA: 0
DIARRHEA: 0
SHORTNESS OF BREATH: 0
RHINORRHEA: 0
SINUS PRESSURE: 0
SORE THROAT: 0
EYE DISCHARGE: 0
VOMITING: 0
CONSTIPATION: 0
ABDOMINAL PAIN: 0
CHEST TIGHTNESS: 0
COUGH: 0

## 2021-12-29 ASSESSMENT — SOCIAL DETERMINANTS OF HEALTH (SDOH): HOW HARD IS IT FOR YOU TO PAY FOR THE VERY BASICS LIKE FOOD, HOUSING, MEDICAL CARE, AND HEATING?: NOT HARD AT ALL

## 2021-12-29 NOTE — PROGRESS NOTES
[address] [facility] PRIMARY CARE  [address] [facility] [address]  Dept: [phone]    Aarti Bush is a 28 y.o. female Established patient, who presents today for her medical conditions/complaints as noted below. Chief Complaint   Patient presents with    Pharyngitis     Pt here today c/o headache, chest tightness,cough, fever, sore throat, and chills. x3 days    Headache    Chest Congestion    Cough    Fever    Chills       HPI:     HPI: The patient is having flu like symptoms over the last two days. Taking tylenol and pseudophed and dayquil. Getting up yellow phlem with cough. Reviewed prior notes None  Reviewed previous Labs    No results found for: LDLCHOLESTEROL, LDLCALC    (goal LDL is <100)   AST (U/L)   Date Value   2015 16     ALT (U/L)   Date Value   2015 11     BUN (mg/dL)   Date Value   2015 9     TSH (mIU/L)   Date Value   03/15/2018 2.91     BP Readings from Last 3 Encounters:   21 118/72   21 112/74   20 112/78          (goal 120/80)    Past Medical History:   Diagnosis Date    PCOS (polycystic ovarian syndrome)       No past surgical history on file. Family History   Problem Relation Age of Onset    Emphysema Mother     Other Father         pneumonmia    Diabetes Father     Emphysema Maternal Grandmother     Heart Attack Maternal Grandfather         late 45s early 46s  MI    No Known Problems Paternal Grandmother     No Known Problems Paternal Grandfather        Social History     Tobacco Use    Smoking status: Never Smoker    Smokeless tobacco: Never Used   Substance Use Topics    Alcohol use: No      Current Outpatient Medications   Medication Sig Dispense Refill    azithromycin (ZITHROMAX) 250 MG tablet 2 tablets now then 1 daily until gone.  1 packet 0    Calcium Carbonate Antacid (TUMS PO) Take by mouth      amphetamine-dextroamphetamine (ADDERALL, 20MG,) 20 MG tablet Take 1 tablet by mouth daily for 30 days. (Patient not taking: Reported on 12/29/2021) 30 tablet 0    amphetamine-dextroamphetamine (ADDERALL XR) 30 MG extended release capsule Take 1 capsule by mouth every morning for 30 days. (Patient not taking: Reported on 12/29/2021) 30 capsule 0    etodolac (LODINE) 400 MG tablet take 1 tablet by mouth twice a day with food (Patient not taking: Reported on 12/29/2021)      citalopram (CELEXA) 20 MG tablet Take 1 tablet by mouth daily take 1 tablet by mouth once daily (Patient not taking: Reported on 12/29/2021) 30 tablet 5    norethindrone-ethinyl estradiol (JUNEL FE 1/20) 1-20 MG-MCG per tablet Take 1 tablet by mouth daily (Patient not taking: Reported on 12/29/2021) 1 packet 11     No current facility-administered medications for this visit. No Known Allergies    Health Maintenance   Topic Date Due    Hepatitis C screen  Never done    Varicella vaccine (1 of 2 - 2-dose childhood series) Never done    COVID-19 Vaccine (1) Never done    Potassium monitoring  03/26/2016    Creatinine monitoring  03/26/2016    Flu vaccine (1) 09/01/2021    Cervical cancer screen  03/11/2022    DTaP/Tdap/Td vaccine (2 - Td or Tdap) 01/28/2025    HIV screen  Completed    Hepatitis A vaccine  Aged Out    Hepatitis B vaccine  Aged Out    Hib vaccine  Aged Out    Meningococcal (ACWY) vaccine  Aged Out    Pneumococcal 0-64 years Vaccine  Aged Out       Subjective:      Review of Systems   Constitutional: Negative for chills, fever and unexpected weight change. HENT: Negative for congestion, hearing loss, rhinorrhea, sinus pressure and sore throat. Eyes: Negative for photophobia, discharge and visual disturbance. Respiratory: Negative for cough, chest tightness and shortness of breath. Cardiovascular: Negative for chest pain, palpitations and leg swelling. Gastrointestinal: Negative for abdominal distention, abdominal pain, constipation, diarrhea and vomiting.    Endocrine: Negative for polydipsia and polyuria. Genitourinary: Negative for decreased urine volume, difficulty urinating, frequency and urgency. Musculoskeletal: Negative for arthralgias, gait problem and myalgias. Skin: Negative for rash. Allergic/Immunologic: Negative for food allergies. Neurological: Negative for dizziness, weakness, numbness and headaches. Hematological: Negative for adenopathy. Psychiatric/Behavioral: Negative for dysphoric mood and sleep disturbance. The patient is not nervous/anxious. Objective:     /72   Pulse 116   Temp 98.2 °F (36.8 °C) (Infrared)   Ht 5' 9\" (1.753 m)   Wt 210 lb 3.2 oz (95.3 kg)   SpO2 98%   BMI 31.04 kg/m²   Physical Exam  Constitutional:       General: She is not in acute distress. Appearance: Normal appearance. She is not ill-appearing. HENT:      Head: Normocephalic and atraumatic. Right Ear: External ear normal.      Left Ear: External ear normal.      Nose: Nose normal.      Mouth/Throat:      Mouth: Mucous membranes are moist.   Eyes:      Extraocular Movements: Extraocular movements intact. Conjunctiva/sclera: Conjunctivae normal.      Pupils: Pupils are equal, round, and reactive to light. Neck:      Vascular: No carotid bruit. Cardiovascular:      Rate and Rhythm: Normal rate and regular rhythm. Pulses: Normal pulses. Heart sounds: Normal heart sounds. Pulmonary:      Effort: Pulmonary effort is normal. No respiratory distress. Breath sounds: Normal breath sounds. Abdominal:      General: Bowel sounds are normal. There is no distension. Tenderness: There is no abdominal tenderness. Musculoskeletal:         General: Normal range of motion. Cervical back: Normal range of motion and neck supple. Lymphadenopathy:      Cervical: No cervical adenopathy. Skin:     General: Skin is warm and dry. Neurological:      General: No focal deficit present.       Mental Status: She is alert and oriented to person, place, and time. Psychiatric:         Mood and Affect: Mood normal.         Behavior: Behavior normal.         Thought Content: Thought content normal.         Assessment and Plan:          1. Cough  -     azithromycin (ZITHROMAX) 250 MG tablet; 2 tablets now then 1 daily until gone., Disp-1 packet, R-0Normal  -     COVID-19; Future  -     POCT rapid strep A  -     POCT Influenza A/B             Patient given educational materials - see patient instructions. Discussed use, benefit, and side effects of prescribed medications. All patient questions answered. Pt voiced understanding. Reviewed health maintenance. Instructed to continue current medications, diet and exercise. Patient agreed with treatment plan. Follow up as directed.      Electronically signed by DEON Mcintyre on 12/29/2021 at 3:49 PM

## 2021-12-30 LAB
SARS-COV-2: ABNORMAL
SARS-COV-2: DETECTED
SOURCE: ABNORMAL

## 2022-02-04 ENCOUNTER — NURSE TRIAGE (OUTPATIENT)
Dept: OTHER | Facility: CLINIC | Age: 32
End: 2022-02-04

## 2022-02-04 NOTE — TELEPHONE ENCOUNTER
Received call from Mj Golden at [facility] with [employer]. Subjective: Caller states \"Since recovering from covid, I have noticed increased cough, chest wall pain, and sob at night. If I laugh, I will go into a coughing fit and start wheezing. I sound like a smoker. Right now, I feel perfectly fine. I think I need an inhaler. \"     Current Symptoms: cough, sob at night, chest wall pain ( from coughing)    Onset: 3 weeks ago; intermittent    Associated Symptoms: NA    Pain Severity: Denies   Temperature: Denies     What has been tried: mucinex    LMP: 1/8/22 Pregnant: No    Recommended disposition: See in office within 3 days. Pt advised to ED/UCC if no available appointments. Care advice provided, patient verbalizes understanding; denies any other questions or concerns; instructed to call back for any new or worsening symptoms. Writer provided warm transfer to [facility] at [facility] for appointment scheduling     Attention Provider: Thank you for allowing me to participate in the care of your patient. The patient was connected to triage in response to information provided to the ECC/PSC. Please do not respond through this encounter as the response is not directed to a shared pool.         Reason for Disposition   Cough has been present for > 3 weeks    Protocols used: COUGH - ACUTE NON-PRODUCTIVE-ADULT-AH

## 2022-02-15 ENCOUNTER — OFFICE VISIT (OUTPATIENT)
Dept: PRIMARY CARE CLINIC | Age: 32
End: 2022-02-15
Payer: COMMERCIAL

## 2022-02-15 VITALS
HEIGHT: 69 IN | HEART RATE: 78 BPM | WEIGHT: 220.6 LBS | OXYGEN SATURATION: 98 % | SYSTOLIC BLOOD PRESSURE: 116 MMHG | BODY MASS INDEX: 32.67 KG/M2 | DIASTOLIC BLOOD PRESSURE: 74 MMHG

## 2022-02-15 DIAGNOSIS — R05.3 POST-COVID CHRONIC COUGH: Primary | ICD-10-CM

## 2022-02-15 DIAGNOSIS — U09.9 POST-COVID CHRONIC COUGH: Primary | ICD-10-CM

## 2022-02-15 PROCEDURE — G8417 CALC BMI ABV UP PARAM F/U: HCPCS | Performed by: PHYSICIAN ASSISTANT

## 2022-02-15 PROCEDURE — 1036F TOBACCO NON-USER: CPT | Performed by: PHYSICIAN ASSISTANT

## 2022-02-15 PROCEDURE — 99213 OFFICE O/P EST LOW 20 MIN: CPT | Performed by: PHYSICIAN ASSISTANT

## 2022-02-15 PROCEDURE — G8484 FLU IMMUNIZE NO ADMIN: HCPCS | Performed by: PHYSICIAN ASSISTANT

## 2022-02-15 PROCEDURE — G8427 DOCREV CUR MEDS BY ELIG CLIN: HCPCS | Performed by: PHYSICIAN ASSISTANT

## 2022-02-15 RX ORDER — PREDNISONE 20 MG/1
20 TABLET ORAL 2 TIMES DAILY
Qty: 10 TABLET | Refills: 0 | Status: SHIPPED | OUTPATIENT
Start: 2022-02-15 | End: 2022-02-20

## 2022-02-15 RX ORDER — ALBUTEROL SULFATE 90 UG/1
2 AEROSOL, METERED RESPIRATORY (INHALATION) 4 TIMES DAILY PRN
Qty: 18 G | Refills: 0 | Status: SHIPPED | OUTPATIENT
Start: 2022-02-15

## 2022-02-15 RX ORDER — BENZONATATE 100 MG/1
100 CAPSULE ORAL 3 TIMES DAILY PRN
Qty: 30 CAPSULE | Refills: 0 | Status: SHIPPED | OUTPATIENT
Start: 2022-02-15 | End: 2022-02-25

## 2022-02-15 ASSESSMENT — ENCOUNTER SYMPTOMS
DIARRHEA: 0
RHINORRHEA: 0
ABDOMINAL DISTENTION: 0
SINUS PRESSURE: 0
CONSTIPATION: 0
SORE THROAT: 0
COUGH: 1
EYE DISCHARGE: 0
WHEEZING: 1
VOMITING: 0
CHEST TIGHTNESS: 0
ABDOMINAL PAIN: 0
PHOTOPHOBIA: 0
SHORTNESS OF BREATH: 0

## 2022-02-15 ASSESSMENT — PATIENT HEALTH QUESTIONNAIRE - PHQ9
SUM OF ALL RESPONSES TO PHQ QUESTIONS 1-9: 0
6. FEELING BAD ABOUT YOURSELF - OR THAT YOU ARE A FAILURE OR HAVE LET YOURSELF OR YOUR FAMILY DOWN: 0
3. TROUBLE FALLING OR STAYING ASLEEP: 0
5. POOR APPETITE OR OVEREATING: 0
4. FEELING TIRED OR HAVING LITTLE ENERGY: 0
9. THOUGHTS THAT YOU WOULD BE BETTER OFF DEAD, OR OF HURTING YOURSELF: 0
SUM OF ALL RESPONSES TO PHQ QUESTIONS 1-9: 0
10. IF YOU CHECKED OFF ANY PROBLEMS, HOW DIFFICULT HAVE THESE PROBLEMS MADE IT FOR YOU TO DO YOUR WORK, TAKE CARE OF THINGS AT HOME, OR GET ALONG WITH OTHER PEOPLE: 0
1. LITTLE INTEREST OR PLEASURE IN DOING THINGS: 0
8. MOVING OR SPEAKING SO SLOWLY THAT OTHER PEOPLE COULD HAVE NOTICED. OR THE OPPOSITE, BEING SO FIGETY OR RESTLESS THAT YOU HAVE BEEN MOVING AROUND A LOT MORE THAN USUAL: 0
SUM OF ALL RESPONSES TO PHQ9 QUESTIONS 1 & 2: 0
7. TROUBLE CONCENTRATING ON THINGS, SUCH AS READING THE NEWSPAPER OR WATCHING TELEVISION: 0
2. FEELING DOWN, DEPRESSED OR HOPELESS: 0

## 2022-02-15 NOTE — PROGRESS NOTES
[address] [facility] PRIMARY CARE  [address]  [facility] [address]  Dept: [phone]    Afia Trevino is a 28 y.o. female Established patient, who presents today for her medical conditions/complaints as noted below. Chief Complaint   Patient presents with    Post-COVID Symptoms     Wheezing and coughing at night mainly        HPI:     HPI: The patient had covid . Did not have very many respiratory issues with covid but two weeks later has gotting some wheezing an chest tightness and cough at night. Reviewed prior notes None  Reviewed previous Labs    No results found for: LDLCHOLESTEROL, LDLCALC    (goal LDL is <100)   AST (U/L)   Date Value   2015 16     ALT (U/L)   Date Value   2015 11     BUN (mg/dL)   Date Value   2015 9     TSH (mIU/L)   Date Value   03/15/2018 2.91     BP Readings from Last 3 Encounters:   02/15/22 116/74   21 118/72   21 112/74          (goal 120/80)    Past Medical History:   Diagnosis Date    PCOS (polycystic ovarian syndrome)       No past surgical history on file.     Family History   Problem Relation Age of Onset    Emphysema Mother     Other Father         pneumonmia    Diabetes Father     Emphysema Maternal Grandmother     Heart Attack Maternal Grandfather         late 45s early 46s  MI    No Known Problems Paternal Grandmother     No Known Problems Paternal Grandfather        Social History     Tobacco Use    Smoking status: Never Smoker    Smokeless tobacco: Never Used   Substance Use Topics    Alcohol use: No      Current Outpatient Medications   Medication Sig Dispense Refill    albuterol sulfate HFA (VENTOLIN HFA) 108 (90 Base) MCG/ACT inhaler Inhale 2 puffs into the lungs 4 times daily as needed for Wheezing 18 g 0    benzonatate (TESSALON PERLES) 100 MG capsule Take 1 capsule by mouth 3 times daily as needed for Cough 30 capsule 0    predniSONE (DELTASONE) 20 MG tablet Take 1 tablet by mouth 2 times daily for 5 days 10 tablet 0    Calcium Carbonate Antacid (TUMS PO) Take by mouth       No current facility-administered medications for this visit. No Known Allergies    Health Maintenance   Topic Date Due    Hepatitis C screen  Never done    Varicella vaccine (1 of 2 - 2-dose childhood series) Never done    Depression Monitoring  Never done    Potassium monitoring  03/26/2016    Creatinine monitoring  03/26/2016    Cervical cancer screen  03/11/2022    COVID-19 Vaccine (2 - Pfizer 3-dose series) 03/05/2022    DTaP/Tdap/Td vaccine (2 - Td or Tdap) 01/28/2025    Flu vaccine  Completed    HIV screen  Completed    Hepatitis A vaccine  Aged Out    Hepatitis B vaccine  Aged Out    Hib vaccine  Aged Out    Meningococcal (ACWY) vaccine  Aged Out    Pneumococcal 0-64 years Vaccine  Aged Out       Subjective:      Review of Systems   Constitutional: Negative for chills, fever and unexpected weight change. HENT: Negative for congestion, hearing loss, rhinorrhea, sinus pressure and sore throat. Eyes: Negative for photophobia, discharge and visual disturbance. Respiratory: Positive for cough and wheezing. Negative for chest tightness and shortness of breath. Cardiovascular: Negative for chest pain, palpitations and leg swelling. Gastrointestinal: Negative for abdominal distention, abdominal pain, constipation, diarrhea and vomiting. Endocrine: Negative for polydipsia and polyuria. Genitourinary: Negative for decreased urine volume, difficulty urinating, frequency and urgency. Musculoskeletal: Negative for arthralgias, gait problem and myalgias. Skin: Negative for rash. Allergic/Immunologic: Negative for food allergies. Neurological: Negative for dizziness, weakness, numbness and headaches. Hematological: Negative for adenopathy. Psychiatric/Behavioral: Negative for dysphoric mood and sleep disturbance. The patient is not nervous/anxious. Objective:     /74   Pulse 78   Ht 5' 9\" (1.753 m)   Wt 220 lb 9.6 oz (100.1 kg)   SpO2 98%   BMI 32.58 kg/m²   Physical Exam  Constitutional:       General: She is not in acute distress. Appearance: Normal appearance. She is not ill-appearing. HENT:      Head: Normocephalic and atraumatic. Right Ear: External ear normal.      Left Ear: External ear normal.      Nose: Nose normal.      Mouth/Throat:      Mouth: Mucous membranes are moist.   Eyes:      Extraocular Movements: Extraocular movements intact. Conjunctiva/sclera: Conjunctivae normal.      Pupils: Pupils are equal, round, and reactive to light. Neck:      Vascular: No carotid bruit. Cardiovascular:      Rate and Rhythm: Normal rate and regular rhythm. Pulses: Normal pulses. Heart sounds: Normal heart sounds. Pulmonary:      Effort: Pulmonary effort is normal. No respiratory distress. Breath sounds: Normal breath sounds. Abdominal:      General: Bowel sounds are normal. There is no distension. Tenderness: There is no abdominal tenderness. Musculoskeletal:         General: Normal range of motion. Cervical back: Normal range of motion and neck supple. Lymphadenopathy:      Cervical: No cervical adenopathy. Skin:     General: Skin is warm and dry. Neurological:      General: No focal deficit present. Mental Status: She is alert and oriented to person, place, and time. Psychiatric:         Mood and Affect: Mood normal.         Behavior: Behavior normal.         Thought Content: Thought content normal.         Assessment and Plan:          1. Post-COVID chronic cough  -     albuterol sulfate HFA (VENTOLIN HFA) 108 (90 Base) MCG/ACT inhaler; Inhale 2 puffs into the lungs 4 times daily as needed for Wheezing, Disp-18 g, R-0Normal  -     benzonatate (TESSALON PERLES) 100 MG capsule;  Take 1 capsule by mouth 3 times daily as needed for Cough, Disp-30 capsule, R-0Normal  -     predniSONE (DELTASONE) 20 MG tablet; Take 1 tablet by mouth 2 times daily for 5 days, Disp-10 tablet, R-0Normal             Patient given educational materials - see patient instructions. Discussed use, benefit, and side effects of prescribed medications. All patient questions answered. Pt voiced understanding. Reviewed health maintenance. Instructed to continue current medications, diet and exercise. Patient agreed with treatment plan. Follow up as directed.      Electronically signed by DEON Mosher on 2/15/2022 at 8:08 AM

## 2023-02-24 ENCOUNTER — OFFICE VISIT (OUTPATIENT)
Dept: PRIMARY CARE CLINIC | Age: 33
End: 2023-02-24

## 2023-02-24 VITALS
DIASTOLIC BLOOD PRESSURE: 82 MMHG | BODY MASS INDEX: 32.58 KG/M2 | HEIGHT: 69 IN | OXYGEN SATURATION: 97 % | SYSTOLIC BLOOD PRESSURE: 126 MMHG | WEIGHT: 220 LBS

## 2023-02-24 DIAGNOSIS — Z3A.25 25 WEEKS GESTATION OF PREGNANCY: ICD-10-CM

## 2023-02-24 DIAGNOSIS — R10.13 DYSPEPSIA: Primary | ICD-10-CM

## 2023-02-24 DIAGNOSIS — R07.89 OTHER CHEST PAIN: ICD-10-CM

## 2023-02-24 PROBLEM — O00.90 ECTOPIC PREGNANCY: Status: ACTIVE | Noted: 2021-09-14

## 2023-02-24 PROCEDURE — 99214 OFFICE O/P EST MOD 30 MIN: CPT | Performed by: PHYSICIAN ASSISTANT

## 2023-02-24 RX ORDER — FAMOTIDINE 20 MG/1
20 TABLET, FILM COATED ORAL 2 TIMES DAILY
COMMUNITY
Start: 2023-02-20

## 2023-02-24 RX ORDER — ASPIRIN 81 MG/1
81 TABLET ORAL DAILY
COMMUNITY

## 2023-02-24 ASSESSMENT — PATIENT HEALTH QUESTIONNAIRE - PHQ9
SUM OF ALL RESPONSES TO PHQ9 QUESTIONS 1 & 2: 0
6. FEELING BAD ABOUT YOURSELF - OR THAT YOU ARE A FAILURE OR HAVE LET YOURSELF OR YOUR FAMILY DOWN: 0
4. FEELING TIRED OR HAVING LITTLE ENERGY: 0
1. LITTLE INTEREST OR PLEASURE IN DOING THINGS: 0
SUM OF ALL RESPONSES TO PHQ QUESTIONS 1-9: 0
5. POOR APPETITE OR OVEREATING: 0
3. TROUBLE FALLING OR STAYING ASLEEP: 0
10. IF YOU CHECKED OFF ANY PROBLEMS, HOW DIFFICULT HAVE THESE PROBLEMS MADE IT FOR YOU TO DO YOUR WORK, TAKE CARE OF THINGS AT HOME, OR GET ALONG WITH OTHER PEOPLE: 0
SUM OF ALL RESPONSES TO PHQ QUESTIONS 1-9: 0
2. FEELING DOWN, DEPRESSED OR HOPELESS: 0
SUM OF ALL RESPONSES TO PHQ QUESTIONS 1-9: 0
9. THOUGHTS THAT YOU WOULD BE BETTER OFF DEAD, OR OF HURTING YOURSELF: 0
8. MOVING OR SPEAKING SO SLOWLY THAT OTHER PEOPLE COULD HAVE NOTICED. OR THE OPPOSITE, BEING SO FIGETY OR RESTLESS THAT YOU HAVE BEEN MOVING AROUND A LOT MORE THAN USUAL: 0
7. TROUBLE CONCENTRATING ON THINGS, SUCH AS READING THE NEWSPAPER OR WATCHING TELEVISION: 0
SUM OF ALL RESPONSES TO PHQ QUESTIONS 1-9: 0

## 2023-02-24 NOTE — PROGRESS NOTES
[facility] Primary Care  [address]  Phone: [phone]  Fax: [phone]    Zev Beaver is a 33 y.o. female who presents today for her medical conditions/complaintsas noted below. Chief Complaint   Patient presents with    Gastroesophageal Reflux     Patient currently 25 weeks preg. She thinks she is having issues with GERD            HPI:     Gastroesophageal Reflux  She complains of chest pain. Last pregnancy Tums helped it. ER put her on Pepcid   Still gets random dull chest pain. Was checked out with EKG and Echo are normal.         Current Outpatient Medications   Medication Sig Dispense Refill    aspirin 81 MG EC tablet Take 81 mg by mouth daily      famotidine (PEPCID) 20 MG tablet Take 20 mg by mouth 2 times daily      Calcium Carbonate Antacid (TUMS PO) Take by mouth       No current facility-administered medications for this visit. No Known Allergies    Subjective:      Review of Systems   Cardiovascular:  Positive for chest pain. Gastrointestinal:         Dyspepsia   Genitourinary:  Decreased urine volume: Dull quick pains. Objective:     /82   Ht 5' 9\" (1.753 m)   Wt 220 lb (99.8 kg)   SpO2 97%   BMI 32.49 kg/m²   Physical Exam    Assessment:       Diagnosis Orders   1. Dyspepsia  US GALLBLADDER RUQ    Lipase    CBC with Auto Differential      2. Other chest pain  US GALLBLADDER RUQ    Lipase    CBC with Auto Differential      3. 25 weeks gestation of pregnancy             Plan:    Gallbladder US  BW ordered  Continue Pepcid bid  Also may use vasyl  Try [facility] on phone    No follow-ups on file.     Orders Placed This Encounter   Procedures    US GALLBLADDER RUQ     Standing Status:   Future     Standing Expiration Date:   2/24/2024     Order Specific Question:   Reason for exam:     Answer:   pain and dyspepsia    Lipase     Standing Status:   Future     Standing Expiration Date:   2/24/2024    CBC with Auto Differential     Standing Status:   Future     Standing Expiration Date:   2/24/2024       No orders of the defined types were placed in this encounter.           Electronically signed by Roldan Sloan 2/24/2023 at 2:39 PM

## 2023-02-27 LAB
BASOPHILS ABSOLUTE: NORMAL
BASOPHILS RELATIVE PERCENT: NORMAL
EOSINOPHILS ABSOLUTE: NORMAL
EOSINOPHILS RELATIVE PERCENT: NORMAL
HCT VFR BLD CALC: NORMAL %
HEMOGLOBIN: NORMAL
LIPASE: NORMAL
LYMPHOCYTES ABSOLUTE: NORMAL
LYMPHOCYTES RELATIVE PERCENT: NORMAL
MCH RBC QN AUTO: NORMAL PG
MCHC RBC AUTO-ENTMCNC: NORMAL G/DL
MCV RBC AUTO: NORMAL FL
MONOCYTES ABSOLUTE: NORMAL
MONOCYTES RELATIVE PERCENT: NORMAL
NEUTROPHILS ABSOLUTE: NORMAL
NEUTROPHILS RELATIVE PERCENT: NORMAL
PDW BLD-RTO: NORMAL %
PLATELET # BLD: NORMAL 10*3/UL
PMV BLD AUTO: NORMAL FL
RBC # BLD: NORMAL 10*6/UL
WBC # BLD: NORMAL 10*3/UL

## 2023-02-28 ENCOUNTER — TELEPHONE (OUTPATIENT)
Dept: PRIMARY CARE CLINIC | Age: 33
End: 2023-02-28

## 2023-02-28 DIAGNOSIS — R10.13 DYSPEPSIA: ICD-10-CM

## 2023-02-28 DIAGNOSIS — R07.89 OTHER CHEST PAIN: ICD-10-CM

## 2023-02-28 NOTE — TELEPHONE ENCOUNTER
Patient called office regarding lab resutls. Patient states she is pregnant and feels she is going to have panic/anxiety attack due to labs. Patient is wanting to speak with Izzy Corado as soon as possible regarding labs. Please advise     Pt is scheduled 3/2 to discuss but would like to hear back before then.

## 2023-03-02 ENCOUNTER — PATIENT MESSAGE (OUTPATIENT)
Dept: PRIMARY CARE CLINIC | Age: 33
End: 2023-03-02

## 2023-03-02 NOTE — TELEPHONE ENCOUNTER
From: Jefferson Levi  To: Barak Watson  Sent: 3/2/2023 8:55 AM EST  Subject: Cbc results    Hello! I'm bassem sorry for missing your call on the 28th & mailbox being full. I didn't realize this even happened. Would you be willing to give me a call again regarding blood test results & what next steps are? Thank you bassem much & sorry for the inconvenience!

## 2023-04-14 PROBLEM — Z3A.30 30 WEEKS GESTATION OF PREGNANCY: Status: ACTIVE | Noted: 2023-04-14

## 2023-04-14 PROBLEM — R51.9 INTRACTABLE EPISODIC HEADACHE: Status: ACTIVE | Noted: 2023-04-14

## 2023-04-28 ENCOUNTER — HOSPITAL ENCOUNTER (OUTPATIENT)
Dept: MRI IMAGING | Facility: CLINIC | Age: 33
End: 2023-04-28
Payer: MEDICAID

## 2023-04-28 DIAGNOSIS — R51.9 INTRACTABLE EPISODIC HEADACHE, UNSPECIFIED HEADACHE TYPE: ICD-10-CM

## 2023-04-28 PROCEDURE — 70551 MRI BRAIN STEM W/O DYE: CPT

## 2023-05-01 NOTE — RESULT ENCOUNTER NOTE
Call patient and advise that test results were okay small lesion which could be angiopathy associated with migraines. Okay to follow up with neurology for this.

## 2023-11-27 ENCOUNTER — TELEPHONE (OUTPATIENT)
Dept: PRIMARY CARE CLINIC | Age: 33
End: 2023-11-27